# Patient Record
Sex: FEMALE | Race: OTHER | NOT HISPANIC OR LATINO
[De-identification: names, ages, dates, MRNs, and addresses within clinical notes are randomized per-mention and may not be internally consistent; named-entity substitution may affect disease eponyms.]

---

## 2020-06-23 PROBLEM — Z00.00 ENCOUNTER FOR PREVENTIVE HEALTH EXAMINATION: Status: ACTIVE | Noted: 2020-06-23

## 2020-06-30 ENCOUNTER — APPOINTMENT (OUTPATIENT)
Dept: UROGYNECOLOGY | Facility: CLINIC | Age: 50
End: 2020-06-30
Payer: COMMERCIAL

## 2020-06-30 DIAGNOSIS — Z78.9 OTHER SPECIFIED HEALTH STATUS: ICD-10-CM

## 2020-06-30 DIAGNOSIS — M53.9 DORSOPATHY, UNSPECIFIED: ICD-10-CM

## 2020-06-30 LAB
BILIRUB UR QL STRIP: NEGATIVE
CLARITY UR: CLEAR
COLLECTION METHOD: NORMAL
GLUCOSE UR-MCNC: NEGATIVE
HCG UR QL: 0.2 EU/DL
HGB UR QL STRIP.AUTO: NORMAL
KETONES UR-MCNC: NEGATIVE
LEUKOCYTE ESTERASE UR QL STRIP: NEGATIVE
NITRITE UR QL STRIP: NEGATIVE
PH UR STRIP: 6.5
PROT UR STRIP-MCNC: NORMAL
SP GR UR STRIP: 1.02

## 2020-06-30 PROCEDURE — 51701 INSERT BLADDER CATHETER: CPT

## 2020-06-30 PROCEDURE — 81003 URINALYSIS AUTO W/O SCOPE: CPT | Mod: QW

## 2020-06-30 PROCEDURE — 99204 OFFICE O/P NEW MOD 45 MIN: CPT | Mod: 25

## 2020-06-30 NOTE — ASSESSMENT
[FreeTextEntry1] : Mauro is a pleasant pre-menop P3, PSHx includes LSC BTL, presents with 5 months of symptomatic POP. On exam, her empty supine CST was neg and she did not have positive urethral hypermobility. Her straight-cath PVR volume was normal and was sent for UA UCx to eval for MH. On pelvic exam, there were no appreciable masses or lesions or cyst. Pelvic floor muscle contraction strength was present but weak. There was no levator or pelvic floor musculature banding, tightness, or tenderness. POPQ exam showed stage II uterine POP only, palpable fibroid uterus with questionable considerable bulky/long cervix component.\par \par POP and its management options including observation, kegels with or without PT, biofeedback, pessary, and surgery were reviewed. Pessary care reviewed. Surgically we discussed abd vs robotic TABITHA / BS / ASC / ? TVT APR / cysto. I will await her TVUS results and uterine size to eval further, but doubt she has vag access for the size of her fibroid uterus to be able to be offered a vaginal approach. R/B/A and efficacies of surgeries discussed. Abd USLS can be done as well. She will return for UDS to eval for OSUI, and cysto to eval freq and obstructive symptoms. She will followup thereafter, all ques answered.\par \par Plan:\par [] TVUS \par [] cysto\par [] UDS\par [] f/u UA, UCx\par [] surg disc, anticipate booking RASCH / BS / ASC / ? TVT and APR / cysto

## 2020-06-30 NOTE — PHYSICAL EXAM
[Chaperone Present] : A chaperone was present in the examining room during all aspects of the physical examination [No Acute Distress] : in no acute distress [Oriented x3] : oriented to person, place, and time [No Edema] : ~T edema was not present [Symmetrical] : the neck was ~L symmetrical [None] : no CVA tenderness [Warm and Dry] : was warm and dry to touch [Normal Gait] : gait was normal [Labia Majora] : were normal [Labia Minora] : were normal [Bartholin's Gland] : both Bartholin's glands were normal  [Normal Appearance] : general appearance was normal [Ba ____] : Ba [unfilled] [Aa ____] : Aa [unfilled] [C ____] : C [unfilled] [PB ____] : PB [unfilled] [GH ____] : GH [unfilled] [TVL ____] : TVL  [unfilled] [Bp ____] : Bp [unfilled] [Ap ____] : Ap [unfilled] [D ____] : D [unfilled] [] : II [Normal] : normal [Post Void Residual ____ml] : post void residual was [unfilled] ml [Soft] :  the cervix was soft [Exam Deferred] : was deferred [Cough] : no cough [Inguinal LAD] : no adenopathy was noted in the inguinal lymph nodes [Tenderness] : ~T no ~M abdominal tenderness observed [Distended] : not distended [FreeTextEntry3] : uroflow low vol void for interpretation, CST neg, no appreciable urethral hypermobility [de-identified] : bulky fibroid uterus, ? apical descent vs bulk factor [FreeTextEntry4] : no cyst mass or lesion [de-identified] : no appreciable adnexal masses however palpable mobile fibroid uterus ? size about 15 cm

## 2020-06-30 NOTE — HISTORY OF PRESENT ILLNESS
[FreeTextEntry1] : Noticed bulge from vagina 2/20 and was concerned, stopped exercising, aware of bulge and was concerned. No intervention as of yet. No incomplete bladder emptying or leakage of urine, no dysuria or gross hematuria, no flank pain. Reports this year intermittent constipation - incomplete evacuation of normal consistency stools, not hard consistency. Associated sexual dysfunction as she was not sure what to do with the prolapse. Premenopausal, LMP last week and irreg this year. Prior to 6/20/20 LMP, no menses in two months. Until earlier this year, was getting regular monthly cycles without irregularity. Reports known asymptomatic fibroid uterus x 20 years. Chronic anemia, reports using ferritin and vit C irregularly to improve it. Reports TVUS last done in 2018 per Gyn.\par \par colonoscopy 2020 normal, diverticulosis, internal hemarrhoids\par 2020 labs recently H/H 12/37 (10/33 in 11/19), Bun/Cr 14/0.6\par \par PSH includes BTL\par Never a smoker\par P3

## 2020-06-30 NOTE — REASON FOR VISIT
[Questionnaire Received] : Patient questionnaire received [Pelvic Organ Prolapse] : pelvic organ prolapse [Sexual Dysfunction] : sexual dysfunction [Problems With Defecation] : problems with defecation

## 2020-06-30 NOTE — OB HISTORY
[Vaginal ___] : [unfilled] vaginal delivery(s) [Definite ___ (Date)] : the last menstrual period was [unfilled] [Last Pap Smear ___] : date of last pap smear was on [unfilled] [Sexually Active] : sexually active [FreeTextEntry1] : largest baby 7 lbs 11 oz

## 2020-06-30 NOTE — PROCEDURE
[Straight Catheterization] : insertion of a straight catheter [Urinary Retention] : urinary retention [Urinary Frequency] : urinary frequency [Patient] : the patient [Intraurethral 2% Lidocaine Gel ___ (cc)] : Local Anesthesia: [unfilled] cc of 2% Lidocaine Gel was administered intraurethrally  [___ Fr Straight Tip] : a [unfilled] in [place]inese straight tip catheter [Clear] : clear [None] : there were no complications with the catheter insertion [Culture] : culture [No Complications] : no complications [Urinalysis] : urinalysis [Tolerated Well] : the patient tolerated the procedure well [1] : 1 [Post procedure instructions and information given] : Post procedure instructions and information were given and reviewed with patient. [FreeTextEntry1] : cathed to obtain PVR and uncontaminated specimen

## 2020-07-01 LAB
APPEARANCE: CLEAR
BACTERIA: NEGATIVE
BILIRUBIN URINE: NEGATIVE
BLOOD URINE: ABNORMAL
COLOR: YELLOW
GLUCOSE QUALITATIVE U: NEGATIVE
HYALINE CASTS: 1 /LPF
KETONES URINE: NEGATIVE
LEUKOCYTE ESTERASE URINE: NEGATIVE
MICROSCOPIC-UA: NORMAL
NITRITE URINE: NEGATIVE
PH URINE: 6.5
PROTEIN URINE: NORMAL
RED BLOOD CELLS URINE: 2 /HPF
SPECIFIC GRAVITY URINE: 1.03
SQUAMOUS EPITHELIAL CELLS: 3 /HPF
UROBILINOGEN URINE: NORMAL
WHITE BLOOD CELLS URINE: 0 /HPF

## 2020-07-02 LAB — BACTERIA UR CULT: NORMAL

## 2020-07-09 ENCOUNTER — APPOINTMENT (OUTPATIENT)
Dept: UROGYNECOLOGY | Facility: CLINIC | Age: 50
End: 2020-07-09
Payer: COMMERCIAL

## 2020-07-09 PROCEDURE — 51784 ANAL/URINARY MUSCLE STUDY: CPT

## 2020-07-09 PROCEDURE — 51741 ELECTRO-UROFLOWMETRY FIRST: CPT

## 2020-07-09 PROCEDURE — 51797 INTRAABDOMINAL PRESSURE TEST: CPT

## 2020-07-09 PROCEDURE — 51729 CYSTOMETROGRAM W/VP&UP: CPT

## 2020-07-15 ENCOUNTER — APPOINTMENT (OUTPATIENT)
Dept: UROGYNECOLOGY | Facility: CLINIC | Age: 50
End: 2020-07-15
Payer: COMMERCIAL

## 2020-07-15 DIAGNOSIS — R31.9 HEMATURIA, UNSPECIFIED: ICD-10-CM

## 2020-07-15 PROCEDURE — 58100 BIOPSY OF UTERUS LINING: CPT

## 2020-07-15 PROCEDURE — 52000 CYSTOURETHROSCOPY: CPT

## 2020-07-15 PROCEDURE — 99213 OFFICE O/P EST LOW 20 MIN: CPT | Mod: 25

## 2020-07-21 LAB — CORE LAB BIOPSY: NORMAL

## 2020-07-22 ENCOUNTER — OUTPATIENT (OUTPATIENT)
Dept: OUTPATIENT SERVICES | Facility: HOSPITAL | Age: 50
LOS: 1 days | End: 2020-07-22
Payer: COMMERCIAL

## 2020-07-22 VITALS
TEMPERATURE: 98 F | HEART RATE: 66 BPM | WEIGHT: 145.51 LBS | HEIGHT: 64 IN | DIASTOLIC BLOOD PRESSURE: 92 MMHG | SYSTOLIC BLOOD PRESSURE: 130 MMHG | RESPIRATION RATE: 20 BRPM

## 2020-07-22 DIAGNOSIS — Z98.890 OTHER SPECIFIED POSTPROCEDURAL STATES: Chronic | ICD-10-CM

## 2020-07-22 DIAGNOSIS — Z29.9 ENCOUNTER FOR PROPHYLACTIC MEASURES, UNSPECIFIED: ICD-10-CM

## 2020-07-22 DIAGNOSIS — Z98.51 TUBAL LIGATION STATUS: Chronic | ICD-10-CM

## 2020-07-22 DIAGNOSIS — R39.15 URGENCY OF URINATION: ICD-10-CM

## 2020-07-22 DIAGNOSIS — N81.3 COMPLETE UTEROVAGINAL PROLAPSE: ICD-10-CM

## 2020-07-22 DIAGNOSIS — Z01.818 ENCOUNTER FOR OTHER PREPROCEDURAL EXAMINATION: ICD-10-CM

## 2020-07-22 LAB
A1C WITH ESTIMATED AVERAGE GLUCOSE RESULT: 4.9 % — SIGNIFICANT CHANGE UP (ref 4–5.6)
ANION GAP SERPL CALC-SCNC: 11 MMOL/L — SIGNIFICANT CHANGE UP (ref 5–17)
APPEARANCE UR: CLEAR — SIGNIFICANT CHANGE UP
APTT BLD: 32.7 SEC — SIGNIFICANT CHANGE UP (ref 27.5–35.5)
BASOPHILS # BLD AUTO: 0.03 K/UL — SIGNIFICANT CHANGE UP (ref 0–0.2)
BASOPHILS NFR BLD AUTO: 0.6 % — SIGNIFICANT CHANGE UP (ref 0–2)
BILIRUB UR-MCNC: NEGATIVE — SIGNIFICANT CHANGE UP
BLD GP AB SCN SERPL QL: SIGNIFICANT CHANGE UP
BUN SERPL-MCNC: 11 MG/DL — SIGNIFICANT CHANGE UP (ref 8–20)
CALCIUM SERPL-MCNC: 8.5 MG/DL — LOW (ref 8.6–10.2)
CHLORIDE SERPL-SCNC: 100 MMOL/L — SIGNIFICANT CHANGE UP (ref 98–107)
CO2 SERPL-SCNC: 24 MMOL/L — SIGNIFICANT CHANGE UP (ref 22–29)
COLOR SPEC: YELLOW — SIGNIFICANT CHANGE UP
CREAT SERPL-MCNC: 0.57 MG/DL — SIGNIFICANT CHANGE UP (ref 0.5–1.3)
DIFF PNL FLD: NEGATIVE — SIGNIFICANT CHANGE UP
EOSINOPHIL # BLD AUTO: 0.09 K/UL — SIGNIFICANT CHANGE UP (ref 0–0.5)
EOSINOPHIL NFR BLD AUTO: 1.8 % — SIGNIFICANT CHANGE UP (ref 0–6)
ESTIMATED AVERAGE GLUCOSE: 94 MG/DL — SIGNIFICANT CHANGE UP (ref 68–114)
GLUCOSE SERPL-MCNC: 82 MG/DL — SIGNIFICANT CHANGE UP (ref 70–99)
GLUCOSE UR QL: NEGATIVE MG/DL — SIGNIFICANT CHANGE UP
HCT VFR BLD CALC: 35.5 % — SIGNIFICANT CHANGE UP (ref 34.5–45)
HGB BLD-MCNC: 11.2 G/DL — LOW (ref 11.5–15.5)
IMM GRANULOCYTES NFR BLD AUTO: 0.2 % — SIGNIFICANT CHANGE UP (ref 0–1.5)
INR BLD: 1.04 RATIO — SIGNIFICANT CHANGE UP (ref 0.88–1.16)
KETONES UR-MCNC: NEGATIVE — SIGNIFICANT CHANGE UP
LEUKOCYTE ESTERASE UR-ACNC: NEGATIVE — SIGNIFICANT CHANGE UP
LYMPHOCYTES # BLD AUTO: 1.46 K/UL — SIGNIFICANT CHANGE UP (ref 1–3.3)
LYMPHOCYTES # BLD AUTO: 28.7 % — SIGNIFICANT CHANGE UP (ref 13–44)
MCHC RBC-ENTMCNC: 28.5 PG — SIGNIFICANT CHANGE UP (ref 27–34)
MCHC RBC-ENTMCNC: 31.5 GM/DL — LOW (ref 32–36)
MCV RBC AUTO: 90.3 FL — SIGNIFICANT CHANGE UP (ref 80–100)
MONOCYTES # BLD AUTO: 0.28 K/UL — SIGNIFICANT CHANGE UP (ref 0–0.9)
MONOCYTES NFR BLD AUTO: 5.5 % — SIGNIFICANT CHANGE UP (ref 2–14)
NEUTROPHILS # BLD AUTO: 3.22 K/UL — SIGNIFICANT CHANGE UP (ref 1.8–7.4)
NEUTROPHILS NFR BLD AUTO: 63.2 % — SIGNIFICANT CHANGE UP (ref 43–77)
NITRITE UR-MCNC: NEGATIVE — SIGNIFICANT CHANGE UP
PH UR: 5 — SIGNIFICANT CHANGE UP (ref 5–8)
PLATELET # BLD AUTO: 233 K/UL — SIGNIFICANT CHANGE UP (ref 150–400)
POTASSIUM SERPL-MCNC: 3.6 MMOL/L — SIGNIFICANT CHANGE UP (ref 3.5–5.3)
POTASSIUM SERPL-SCNC: 3.6 MMOL/L — SIGNIFICANT CHANGE UP (ref 3.5–5.3)
PROT UR-MCNC: NEGATIVE MG/DL — SIGNIFICANT CHANGE UP
PROTHROM AB SERPL-ACNC: 12 SEC — SIGNIFICANT CHANGE UP (ref 10.6–13.6)
RBC # BLD: 3.93 M/UL — SIGNIFICANT CHANGE UP (ref 3.8–5.2)
RBC # FLD: 12.2 % — SIGNIFICANT CHANGE UP (ref 10.3–14.5)
SODIUM SERPL-SCNC: 135 MMOL/L — SIGNIFICANT CHANGE UP (ref 135–145)
SP GR SPEC: 1.02 — SIGNIFICANT CHANGE UP (ref 1.01–1.02)
UROBILINOGEN FLD QL: NEGATIVE MG/DL — SIGNIFICANT CHANGE UP
WBC # BLD: 5.09 K/UL — SIGNIFICANT CHANGE UP (ref 3.8–10.5)
WBC # FLD AUTO: 5.09 K/UL — SIGNIFICANT CHANGE UP (ref 3.8–10.5)

## 2020-07-22 PROCEDURE — G0463: CPT

## 2020-07-22 PROCEDURE — 93010 ELECTROCARDIOGRAM REPORT: CPT

## 2020-07-22 PROCEDURE — 93005 ELECTROCARDIOGRAM TRACING: CPT

## 2020-07-22 NOTE — H&P PST ADULT - NSICDXPASTSURGICALHX_GEN_ALL_CORE_FT
PAST SURGICAL HISTORY:  H/O colonoscopy 1/22/20    H/O tubal ligation PAST SURGICAL HISTORY:  H/O breast biopsy     H/O colonoscopy 1/22/20    H/O tubal ligation

## 2020-07-22 NOTE — H&P PST ADULT - HISTORY OF PRESENT ILLNESS
48 yo F   x3 LMP 2020 menstrual bleeding from - 48 yo F   x3 LMP 2020 c/o irregular menstrual bleeding from -. Recent biopsy showed multiple uterine fibroids, concern for polyp. Presents for preop assessment   Denies fevers, chills, abdominal or pelvic pain 50 yo F   x3 LMP 2020 c/o uterine prolapse since 2020 and irregular menstrual bleeding from -. Recent MRI/sono showed multiple uterine fibroids, concern for polyp. Pt denies fevers, chills, abdominal or pelvic pain, n/v/c/d, bloating or distension, dysuria or urinary incontinence. Pt endorses increased urinary frequency and needing to urinate at night. Presents for preop assessment prior to robotic assisted total hysterectomy, b/l salpingectomy, uterosacral ligament suspension, cystoscopy w/Dr Fulton on .

## 2020-07-22 NOTE — H&P PST ADULT - NSICDXPROBLEM_GEN_ALL_CORE_FT
PROBLEM DIAGNOSES  Problem: Urinary urgency  Assessment and Plan: preop assessment, med clearance pending, robotic assisted total hysterectomy, b/l salpingectomy, uterosacral ligament suspension, cystoscopy 8/5    Problem: Complete uterovaginal prolapse  Assessment and Plan: preop assessment, med clearance pending, robotic assisted total hysterectomy, b/l salpingectomy, uterosacral ligament supension, cystoscopy 8/5    Problem: Need for prophylactic measure  Assessment and Plan: caprini score 3, moderate risk for dvt SCD ordered, surgical team to assess for dvt prophylaxis

## 2020-07-22 NOTE — H&P PST ADULT - NSICDXFAMILYHX_GEN_ALL_CORE_FT
FAMILY HISTORY:  FH: hypertension, mother FAMILY HISTORY:  Family history of osteoarthritis, mother, sister  FH: hypertension, mother, sister  FH: thyroid disease, sister

## 2020-07-22 NOTE — H&P PST ADULT - ASSESSMENT
50 yo F 48 yo F   x3 LMP 2020 c/o uterine prolapse since 2020 and irregular menstrual bleeding from -. Recent MRI/sono showed multiple uterine fibroids, concern for polyp. Pt denies fevers, chills, abdominal or pelvic pain, n/v/c/d, bloating or distension, dysuria or urinary incontinence. Pt endorses increased urinary frequency and needing to urinate at night. Presents for preop assessment prior to robotic assisted total hysterectomy, b/l salpingectomy, uterosacral ligament suspension, cystoscopy w/Dr Fulton on .       OPIOID RISK TOOL    ALINA EACH BOX THAT APPLIES AND ADD TOTALS AT THE END    FAMILY HISTORY OF SUBSTANCE ABUSE                 FEMALE         MALE                                                Alcohol                             [  ]1 pt          [  ]3pts                                               Illegal Durgs                     [  ]2 pts        [  ]3pts                                               Rx Drugs                           [  ]4 pts        [  ]4 pts    PERSONAL HISTORY OF SUBSTANCE ABUSE                                                                                          Alcohol                             [  ]3 pts       [  ]3 pts                                               Illegal Drugs                     [  ]4 pts        [  ]4 pts                                               Rx Drugs                           [  ]5 pts        [  ]5 pts    AGE BETWEEN 16-45 YEARS                                      [  ]1 pt         [  ]1 pt    HISTORY OF PREADOLESCENT   SEXUAL ABUSE                                                             [  ]3 pts        [  ]0pts    PSYCHOLOGICAL DISEASE                     ADD, OCD, Bipolar, Schizophrenia        [  ]2 pts         [  ]2 pts                      Depression                                               [  ]1 pt           [  ]1 pt           SCORING TOTAL   (add numbers and type here)              ( 0 )                                     A score of 3 or lower indicated LOW risk for future opioid abuse  A score of 4 to 7 indicated moderate risk for future opioid abuse  A score of 8 or higher indicates a high risk for opioid abuse    CAPRINI SCORE [CLOT]    AGE RELATED RISK FACTORS                                                       MOBILITY RELATED FACTORS  [ x] Age 41-60 years                                            (1 Point)                  [ ] Bed rest                                                        (1 Point)  [ ] Age: 61-74 years                                           (2 Points)                 [ ] Plaster cast                                                   (2 Points)  [ ] Age= 75 years                                              (3 Points)                 [ ] Bed bound for more than 72 hours                 (2 Points)    DISEASE RELATED RISK FACTORS                                               GENDER SPECIFIC FACTORS  [ ] Edema in the lower extremities                       (1 Point)                  [ ] Pregnancy                                                     (1 Point)  [ ] Varicose veins                                               (1 Point)                  [ ] Post-partum < 6 weeks                                   (1 Point)             [ ] BMI > 25 Kg/m2                                            (1 Point)                  [ ] Hormonal therapy  or oral contraception          (1 Point)                 [ ] Sepsis (in the previous month)                        (1 Point)                  [ ] History of pregnancy complications                 (1 point)  [ ] Pneumonia or serious lung disease                                               [ ] Unexplained or recurrent                     (1 Point)           (in the previous month)                               (1 Point)  [ ] Abnormal pulmonary function test                     (1 Point)                 SURGERY RELATED RISK FACTORS  [ ] Acute myocardial infarction                              (1 Point)                 [ ]  Section                                             (1 Point)  [ ] Congestive heart failure (in the previous month)  (1 Point)               [ ] Minor surgery                                                  (1 Point)   [ ] Inflammatory bowel disease                             (1 Point)                 [ ] Arthroscopic surgery                                        (2 Points)  [ ] Central venous access                                      (2 Points)                [x] General surgery lasting more than 45 minutes   (2 Points)       [ ] Stroke (in the previous month)                          (5 Points)               [ ] Elective arthroplasty                                         (5 Points)                                                                                                                                               HEMATOLOGY RELATED FACTORS                                                 TRAUMA RELATED RISK FACTORS  [ ] Prior episodes of VTE                                     (3 Points)                [ ] Fracture of the hip, pelvis, or leg                       (5 Points)  [ ] Positive family history for VTE                         (3 Points)                 [ ] Acute spinal cord injury (in the previous month)  (5 Points)  [ ] Prothrombin 82384 A                                     (3 Points)                 [ ] Paralysis  (less than 1 month)                             (5 Points)  [ ] Factor V Leiden                                             (3 Points)                  [ ] Multiple Trauma within 1 month                        (5 Points)  [ ] Lupus anticoagulants                                     (3 Points)                                                           [ ] Anticardiolipin antibodies                               (3 Points)                                                       [ ] High homocysteine in the blood                      (3 Points)                                             [ ] Other congenital or acquired thrombophilia      (3 Points)                                                [ ] Heparin induced thrombocytopenia                  (3 Points)                                          Total Score [       3   ]    Caprini Score 0 - 2:  Low Risk, No VTE Prophylaxis required for most patients, encourage ambulation  Caprini Score 3 - 6:  At Risk, pharmacologic VTE prophylaxis is indicated for most patients (in the absence of a contraindication)  Caprini Score Greater than or = 7:  High Risk, pharmacologic VTE prophylaxis is indicated for most patients (in the absence of a contraindication)

## 2020-07-22 NOTE — H&P PST ADULT - NSICDXPASTMEDICALHX_GEN_ALL_CORE_FT
PAST MEDICAL HISTORY:  Complete uterovaginal prolapse     Urinary urgency PAST MEDICAL HISTORY:  Complete uterovaginal prolapse     Urinary urgency     Uterine leiomyoma

## 2020-07-23 LAB
CULTURE RESULTS: SIGNIFICANT CHANGE UP
SPECIMEN SOURCE: SIGNIFICANT CHANGE UP

## 2020-07-24 ENCOUNTER — APPOINTMENT (OUTPATIENT)
Dept: UROGYNECOLOGY | Facility: CLINIC | Age: 50
End: 2020-07-24
Payer: COMMERCIAL

## 2020-07-24 DIAGNOSIS — N92.1 EXCESSIVE AND FREQUENT MENSTRUATION WITH IRREGULAR CYCLE: ICD-10-CM

## 2020-07-24 PROCEDURE — 99214 OFFICE O/P EST MOD 30 MIN: CPT

## 2020-07-24 NOTE — HISTORY OF PRESENT ILLNESS
[FreeTextEntry1] : Symptomatic stage II uterine prolapse, urinary freq urge. Desires surgical intervention, declines nonsurg POP txs such as observation, kegels/PT, pessary use. Sexually active. \par \par TVUS results discussed: large 12 x 10 cm with multiple fibroids uterus noted, EE normal however + moderate Em fluid and ? polyp; ovs normal with a simple appearing unilateral cyst and no FF in cul-de-sac.\par \par MRI pelvis with and without contrast reviewed: uterus 9 x 9 x 10 cm with multiple fibroids: intramural left 5 cm, posterior abutting em cavity 2 cm, 3 cm left intramural, 3 cm intramural/suberosal fundal; em cav 13 mm, junct zone 6 mm, small FF, 1 cm left adnexal cyst without enhancement, no bulky pelvic LAD, ut prolapse.\par \par Cysto: normal.\par UDS: elevated pvr 120 ml, low capacity, no GSUI, no ISD, no DO. \par Embx: fragments of endometrial polyp, benign cervical tissue\par \par Hx anemia however most recent labs H/H wnl - she states after iron supplementation\par PSH includes BTL\par Never a smoker\par P3 \par NKDA

## 2020-08-01 DIAGNOSIS — Z01.818 ENCOUNTER FOR OTHER PREPROCEDURAL EXAMINATION: ICD-10-CM

## 2020-08-02 ENCOUNTER — APPOINTMENT (OUTPATIENT)
Dept: DISASTER EMERGENCY | Facility: CLINIC | Age: 50
End: 2020-08-02

## 2020-08-03 LAB — SARS-COV-2 N GENE NPH QL NAA+PROBE: NOT DETECTED

## 2020-08-03 RX ORDER — OXYCODONE AND ACETAMINOPHEN 5; 325 MG/1; MG/1
5-325 TABLET ORAL
Qty: 10 | Refills: 0 | Status: ACTIVE | COMMUNITY
Start: 2020-08-03 | End: 1900-01-01

## 2020-08-04 DIAGNOSIS — N83.209 UNSPECIFIED OVARIAN CYST, UNSPECIFIED SIDE: ICD-10-CM

## 2020-08-05 ENCOUNTER — APPOINTMENT (OUTPATIENT)
Dept: UROGYNECOLOGY | Facility: HOSPITAL | Age: 50
End: 2020-08-05

## 2020-08-05 PROBLEM — R39.15 URGENCY OF URINATION: Chronic | Status: ACTIVE | Noted: 2020-07-22

## 2020-08-05 PROBLEM — D25.9 LEIOMYOMA OF UTERUS, UNSPECIFIED: Chronic | Status: ACTIVE | Noted: 2020-07-22

## 2020-08-05 PROBLEM — N81.3 COMPLETE UTEROVAGINAL PROLAPSE: Chronic | Status: ACTIVE | Noted: 2020-07-22

## 2020-08-05 LAB — CANCER AG125 SERPL-ACNC: 26 U/ML

## 2020-08-06 ENCOUNTER — APPOINTMENT (OUTPATIENT)
Dept: ORTHOPEDIC SURGERY | Facility: CLINIC | Age: 50
End: 2020-08-06
Payer: COMMERCIAL

## 2020-08-06 VITALS
WEIGHT: 145 LBS | TEMPERATURE: 98 F | DIASTOLIC BLOOD PRESSURE: 86 MMHG | HEIGHT: 65 IN | HEART RATE: 75 BPM | BODY MASS INDEX: 24.16 KG/M2 | SYSTOLIC BLOOD PRESSURE: 136 MMHG

## 2020-08-06 PROCEDURE — 99203 OFFICE O/P NEW LOW 30 MIN: CPT

## 2020-08-06 NOTE — PHYSICAL EXAM
[FreeTextEntry1] : General: well nourished, in no acute distress, alert and oriented to person, place and time.\par Psychiatric: normal mood and affect, no abnormal movements or speech patterns.\par Eyes: vision intact without deficits, sclera and conjunctiva were normal, pupils were equal in size. \par ENT: Ears and nose were normal in appearance. No thyromegaly.\par Lymph: no enlarged nodes, no lymphedema in extremity.\par Respiratory: Normal respiratory rhythm and effort. No wheezing detected without auscultation. No shortness of breath or respiratory distress.\par Cardiac: no cardiac related leg swelling, 2+ peripheral pulses.\par Neurology: normal gross sensation in extremities to light touch.\par Abdomen: soft, non-tender, tympanic, no masses.\par \par Spine:\par Gait: ambulates fluidly without assistance. Able to heel and toe walk. \par Skin intact, no ecchymosis or cutaneous lesions. Overall alignment neutral.  There are no palpable masses.\par No TTP over spinous processes or with deep palpation over the sacrum. No TTP over b/l lumbar paraspinal muscles. \par B/L L2-S1 5/5. \par B/L L2-S1 SILT.\par Negative SLR B/L\par No clonus or saddle anesthesia.\par Symmetric reflexes.

## 2020-08-06 NOTE — DATA REVIEWED
[de-identified] : MRI (7/29/2020, [facility]): There is a 1.2 x 1.3 cm enhancing lesion on the left side of the sacrum.

## 2020-08-06 NOTE — HISTORY OF PRESENT ILLNESS
[FreeTextEntry1] : This is a 49-year-old female with no past medical history who presents for evaluation of an incidentally found lesion within her left sacrum.  This was found after she was being worked up for a uterine myoma, being planned for elective hysterectomy.  The hysterectomy has been postponed due to this finding.  During this time she has not had any symptoms related to this lesion.  She does describe 2 weeks of mild lower back pain which is centrally located and does not radiate.  This pain can be up to 9 out of 10 at times.  She denies any recent trauma or twisting injury.  She also denies any constitutional symptoms.  She has not required any pain medication during this time.

## 2020-08-14 ENCOUNTER — APPOINTMENT (OUTPATIENT)
Dept: NUCLEAR MEDICINE | Facility: IMAGING CENTER | Age: 50
End: 2020-08-14
Payer: COMMERCIAL

## 2020-08-14 ENCOUNTER — OUTPATIENT (OUTPATIENT)
Dept: OUTPATIENT SERVICES | Facility: HOSPITAL | Age: 50
LOS: 1 days | End: 2020-08-14
Payer: SELF-PAY

## 2020-08-14 ENCOUNTER — RESULT REVIEW (OUTPATIENT)
Age: 50
End: 2020-08-14

## 2020-08-14 DIAGNOSIS — Z98.51 TUBAL LIGATION STATUS: Chronic | ICD-10-CM

## 2020-08-14 DIAGNOSIS — M53.3 SACROCOCCYGEAL DISORDERS, NOT ELSEWHERE CLASSIFIED: ICD-10-CM

## 2020-08-14 DIAGNOSIS — Z98.890 OTHER SPECIFIED POSTPROCEDURAL STATES: Chronic | ICD-10-CM

## 2020-08-14 PROCEDURE — A9561: CPT

## 2020-08-14 PROCEDURE — 78830 RP LOCLZJ TUM SPECT W/CT 1: CPT

## 2020-08-14 PROCEDURE — 78830 RP LOCLZJ TUM SPECT W/CT 1: CPT | Mod: 26

## 2020-08-14 PROCEDURE — 78306 BONE IMAGING WHOLE BODY: CPT | Mod: 26

## 2020-08-14 PROCEDURE — 78306 BONE IMAGING WHOLE BODY: CPT

## 2020-08-21 ENCOUNTER — APPOINTMENT (OUTPATIENT)
Dept: UROGYNECOLOGY | Facility: CLINIC | Age: 50
End: 2020-08-21

## 2020-09-02 ENCOUNTER — APPOINTMENT (OUTPATIENT)
Dept: INTERVENTIONAL RADIOLOGY/VASCULAR | Facility: CLINIC | Age: 50
End: 2020-09-02
Payer: COMMERCIAL

## 2020-09-02 VITALS
SYSTOLIC BLOOD PRESSURE: 137 MMHG | HEART RATE: 77 BPM | OXYGEN SATURATION: 100 % | BODY MASS INDEX: 23.99 KG/M2 | WEIGHT: 144 LBS | HEIGHT: 65 IN | DIASTOLIC BLOOD PRESSURE: 94 MMHG | TEMPERATURE: 98.4 F | RESPIRATION RATE: 14 BRPM

## 2020-09-02 PROCEDURE — XXXXX: CPT

## 2020-09-06 ENCOUNTER — TRANSCRIPTION ENCOUNTER (OUTPATIENT)
Age: 50
End: 2020-09-06

## 2020-09-08 ENCOUNTER — LABORATORY RESULT (OUTPATIENT)
Age: 50
End: 2020-09-08

## 2020-09-08 ENCOUNTER — APPOINTMENT (OUTPATIENT)
Dept: DISASTER EMERGENCY | Facility: CLINIC | Age: 50
End: 2020-09-08

## 2020-09-08 NOTE — ASSESSMENT
[FreeTextEntry1] : MRI and bone scan reviewed. Plan is for CT guided biopsy of sclerotic bone scan + lesion in the left side of the sacrum with anesthesia.\par \par Risks of percutaneous biopsy of left sacral lesion, including but not limited to bleeding, infection and injury to surrounding structure, discussed with the patient.  All questions were answered.  Patient elects to proceed with the biopsy.\par \par

## 2020-09-08 NOTE — HISTORY OF PRESENT ILLNESS
[FreeTextEntry1] : 50 yr old female with hx of uterine myoma. Pt also reports uterine fibroids along with dysmenorrhea. She was undergoing presurgical planning for elective hysterectomy. She got an MRI pelvis (7/29/2020) with incidental finding of: 1.2 x 1.3 cm enhancing lesion on the left side of the sacrum. She reports lower back pain. At times she feels a sharp pain which usually comes on after sitting for long periods. She is unclear as to what brings about the pain and what alleviates the pain. She has not taken anything for the pain. She has tried stretching and rest to try to alleviate the pain. Pt also reports uterine fibroids along with dysmenorrhea.

## 2020-09-09 ENCOUNTER — APPOINTMENT (OUTPATIENT)
Dept: DISASTER EMERGENCY | Facility: CLINIC | Age: 50
End: 2020-09-09

## 2020-09-09 ENCOUNTER — LABORATORY RESULT (OUTPATIENT)
Age: 50
End: 2020-09-09

## 2020-09-11 ENCOUNTER — RESULT REVIEW (OUTPATIENT)
Age: 50
End: 2020-09-11

## 2020-09-11 ENCOUNTER — OUTPATIENT (OUTPATIENT)
Dept: OUTPATIENT SERVICES | Facility: HOSPITAL | Age: 50
LOS: 1 days | End: 2020-09-11
Payer: COMMERCIAL

## 2020-09-11 DIAGNOSIS — Z98.51 TUBAL LIGATION STATUS: Chronic | ICD-10-CM

## 2020-09-11 DIAGNOSIS — Z98.890 OTHER SPECIFIED POSTPROCEDURAL STATES: Chronic | ICD-10-CM

## 2020-09-11 DIAGNOSIS — M53.9 DORSOPATHY, UNSPECIFIED: ICD-10-CM

## 2020-09-11 PROCEDURE — 88305 TISSUE EXAM BY PATHOLOGIST: CPT | Mod: 26

## 2020-09-11 PROCEDURE — 88365 INSITU HYBRIDIZATION (FISH): CPT | Mod: 26,59

## 2020-09-11 PROCEDURE — 88367 INSITU HYBRIDIZATION AUTO: CPT | Mod: 26,59

## 2020-09-11 PROCEDURE — 88364 INSITU HYBRIDIZATION (FISH): CPT | Mod: 26,59

## 2020-09-11 PROCEDURE — 88341 IMHCHEM/IMCYTCHM EA ADD ANTB: CPT | Mod: 26,59

## 2020-09-11 PROCEDURE — 88342 IMHCHEM/IMCYTCHM 1ST ANTB: CPT | Mod: 26,59

## 2020-09-11 PROCEDURE — 20225 BONE BIOPSY TROCAR/NDL DEEP: CPT

## 2020-09-11 PROCEDURE — 77012 CT SCAN FOR NEEDLE BIOPSY: CPT | Mod: 26

## 2020-09-11 PROCEDURE — 88173 CYTOPATH EVAL FNA REPORT: CPT | Mod: 26

## 2020-09-11 PROCEDURE — 88307 TISSUE EXAM BY PATHOLOGIST: CPT | Mod: 26

## 2020-09-18 ENCOUNTER — APPOINTMENT (OUTPATIENT)
Dept: UROGYNECOLOGY | Facility: CLINIC | Age: 50
End: 2020-09-18

## 2020-09-21 LAB — NON-GYNECOLOGICAL CYTOLOGY STUDY: SIGNIFICANT CHANGE UP

## 2021-02-05 ENCOUNTER — LABORATORY RESULT (OUTPATIENT)
Age: 51
End: 2021-02-05

## 2021-02-05 ENCOUNTER — APPOINTMENT (OUTPATIENT)
Dept: ORTHOPEDIC SURGERY | Facility: CLINIC | Age: 51
End: 2021-02-05
Payer: COMMERCIAL

## 2021-02-05 VITALS
WEIGHT: 145 LBS | HEART RATE: 71 BPM | BODY MASS INDEX: 24.16 KG/M2 | SYSTOLIC BLOOD PRESSURE: 152 MMHG | HEIGHT: 65 IN | DIASTOLIC BLOOD PRESSURE: 100 MMHG | OXYGEN SATURATION: 99 %

## 2021-02-05 PROCEDURE — 72170 X-RAY EXAM OF PELVIS: CPT

## 2021-02-05 PROCEDURE — 99214 OFFICE O/P EST MOD 30 MIN: CPT

## 2021-02-05 PROCEDURE — 99072 ADDL SUPL MATRL&STAF TM PHE: CPT

## 2021-02-05 NOTE — DISCUSSION/SUMMARY
[de-identified] : This is a 50-year-old female with a left sacral lesion, possibly reactive based on the pathology findings, currently with 2 months of diffuse body aches and 2 weeks of night sweats.  Her symptoms are concerning for possible underlying condition such as multiple myeloma or other.  I have therefore recommended a skeletal survey, and a variety of labs including SPEP/UPEP, CBC, CRP, ESR, CMP, and LDH for further work-up.  Based on these findings I may then refer her to a hematologist/oncologist for possible marrow aspiration if indicated.  All of this was explained to the patient and all questions were answered.

## 2021-02-05 NOTE — PHYSICAL EXAM
[FreeTextEntry1] : General: well nourished, in no acute distress, alert and oriented to person, place and time.\par Psychiatric: normal mood and affect, no abnormal movements or speech patterns.\par Eyes: vision intact without deficits, sclera and conjunctiva were normal, pupils were equal in size. \par ENT: Ears and nose were normal in appearance. No thyromegaly.\par Lymph: no enlarged nodes, no lymphedema in extremity.\par Respiratory: Normal respiratory rhythm and effort. No wheezing detected without auscultation. No shortness of breath or respiratory distress.\par Cardiac: no cardiac related leg swelling, 2+ peripheral pulses.\par Neurology: normal gross sensation in extremities to light touch.\par Abdomen: soft, non-tender, tympanic, no masses.\par \par B/L LE:\par \par Skin CDI. No ecchymosis or swelling. \par No instability. No TTP over GT. No palpable masses. No lymphedema.\par Negative LR, HS, SLR. \par ROM: FF: 90. IR: 5. ER: 30, painless\par EHL/FHL/GS/TA 5/5. S/S/SP/DP/T SILT. Toes warm, BCR. Compartments soft.\par R foot: cdi, no TTP throughout. \par \par R elbow: skin cdi, no TTP, full painless ROM and supination/pronation. \par \par +mild midline mid back TTP without any palpable deformity.

## 2021-02-05 NOTE — DATA REVIEWED
[de-identified] : 2/5/2021–[facility] pelvis x-ray: When compared to prior x-ray from 7/29/2020, there are no obvious changes.  There are possible increased lucencies within the superior aspect of the acetabulum bilaterally, however this is difficult to ascertain on x-ray.  There are no other visible pathologic fractures or obvious osseous lesions.

## 2021-02-05 NOTE — HISTORY OF PRESENT ILLNESS
[FreeTextEntry1] : This is a 50-year-old female with no past medical history who returns for FU of an incidentally found lesion within her left sacrum. This was found after she was being worked up for a uterine myoma, being planned for elective hysterectomy. IR core biopsy was performed (9/2/2020) showed findings consistent with reactive changes: normocellular marrow with trilineage hematopoiesis with maturation, normal megakaryocytes, with unremarkable morphology, and mild plasmacytosis.  Further immunohistochemical staining was performed showing polytypic plasma cells positive for Mum 1 and negative for CD56, also consistent with reactive change.  The patient was therefore recommended to proceed with surveillance with an interval follow-up within 3 months.  The patient however failed to follow-up, mostly due to the Covid pandemic per the patient.  \par \par Today the patient reports diffuse body aches, including her right elbow, fingers, left shoulder, right foot, back, and right groin.  This all began 2 months ago.  Her pain is up to 8 out of 10 in severity, currently 2 out of 10 while in the office.  She has not tried any pain medication.  She otherwise denies any fevers or weight loss but does note 2 weeks of night sweats.

## 2021-02-06 ENCOUNTER — RESULT REVIEW (OUTPATIENT)
Age: 51
End: 2021-02-06

## 2021-02-06 ENCOUNTER — APPOINTMENT (OUTPATIENT)
Dept: RADIOLOGY | Facility: IMAGING CENTER | Age: 51
End: 2021-02-06
Payer: COMMERCIAL

## 2021-02-06 ENCOUNTER — OUTPATIENT (OUTPATIENT)
Dept: OUTPATIENT SERVICES | Facility: HOSPITAL | Age: 51
LOS: 1 days | End: 2021-02-06
Payer: COMMERCIAL

## 2021-02-06 DIAGNOSIS — Z98.890 OTHER SPECIFIED POSTPROCEDURAL STATES: Chronic | ICD-10-CM

## 2021-02-06 DIAGNOSIS — Z98.51 TUBAL LIGATION STATUS: Chronic | ICD-10-CM

## 2021-02-06 DIAGNOSIS — Z00.8 ENCOUNTER FOR OTHER GENERAL EXAMINATION: ICD-10-CM

## 2021-02-06 LAB
ALBUMIN SERPL ELPH-MCNC: 4.1 G/DL
ALP BLD-CCNC: 64 U/L
ALT SERPL-CCNC: 9 U/L
ANION GAP SERPL CALC-SCNC: 12 MMOL/L
AST SERPL-CCNC: 18 U/L
BASOPHILS # BLD AUTO: 0.03 K/UL
BASOPHILS NFR BLD AUTO: 0.7 %
BILIRUB SERPL-MCNC: 0.2 MG/DL
BUN SERPL-MCNC: 14 MG/DL
CALCIUM SERPL-MCNC: 9.2 MG/DL
CHLORIDE SERPL-SCNC: 102 MMOL/L
CO2 SERPL-SCNC: 24 MMOL/L
CREAT SERPL-MCNC: 0.82 MG/DL
CRP SERPL-MCNC: <0.1 MG/DL
EOSINOPHIL # BLD AUTO: 0.16 K/UL
EOSINOPHIL NFR BLD AUTO: 3.5 %
ERYTHROCYTE [SEDIMENTATION RATE] IN BLOOD BY WESTERGREN METHOD: 33 MM/HR
GLUCOSE SERPL-MCNC: 83 MG/DL
HCT VFR BLD CALC: 33.3 %
HGB BLD-MCNC: 9.9 G/DL
IMM GRANULOCYTES NFR BLD AUTO: 0.2 %
LDH SERPL-CCNC: 168 U/L
LYMPHOCYTES # BLD AUTO: 1.43 K/UL
LYMPHOCYTES NFR BLD AUTO: 31.6 %
MAN DIFF?: NORMAL
MCHC RBC-ENTMCNC: 21.2 PG
MCHC RBC-ENTMCNC: 29.7 GM/DL
MCV RBC AUTO: 71.5 FL
MONOCYTES # BLD AUTO: 0.26 K/UL
MONOCYTES NFR BLD AUTO: 5.8 %
NEUTROPHILS # BLD AUTO: 2.63 K/UL
NEUTROPHILS NFR BLD AUTO: 58.2 %
PLATELET # BLD AUTO: 273 K/UL
POTASSIUM SERPL-SCNC: 4.4 MMOL/L
PROT SERPL-MCNC: 7.6 G/DL
RBC # BLD: 4.66 M/UL
RBC # FLD: 23.4 %
SODIUM SERPL-SCNC: 138 MMOL/L
WBC # FLD AUTO: 4.52 K/UL

## 2021-02-06 PROCEDURE — 77074 RADEX OSSEOUS SURVEY LMTD: CPT | Mod: 26

## 2021-02-06 PROCEDURE — 77074 RADEX OSSEOUS SURVEY LMTD: CPT

## 2021-02-11 LAB
ALBUMIN MFR SERPL ELPH: 50.1 %
ALBUMIN SERPL-MCNC: 3.7 G/DL
ALBUMIN/GLOB SERPL: 1 RATIO
ALBUPE: 20 %
ALPHA1 GLOB MFR SERPL ELPH: 3.4 %
ALPHA1 GLOB SERPL ELPH-MCNC: 0.3 G/DL
ALPHA1UPE: 33 %
ALPHA2 GLOB MFR SERPL ELPH: 8.8 %
ALPHA2 GLOB SERPL ELPH-MCNC: 0.7 G/DL
ALPHA2UPE: 17.1 %
B-GLOBULIN MFR SERPL ELPH: 13.4 %
B-GLOBULIN SERPL ELPH-MCNC: 1 G/DL
BETAUPE: 17.7 %
CREAT 24H UR-MCNC: NORMAL G/24 H
CREATININE UR (MAYO): 93 MG/DL
GAMMA GLOB FLD ELPH-MCNC: 1.8 G/DL
GAMMA GLOB MFR SERPL ELPH: 24.3 %
GAMMAUPE: 12.2 %
IGA 24H UR QL IFE: NORMAL
INTERPRETATION SERPL IEP-IMP: NORMAL
KAPPA LC 24H UR QL: NORMAL
PROT PATTERN 24H UR ELPH-IMP: NORMAL
PROT SERPL-MCNC: 7.4 G/DL
PROT SERPL-MCNC: 7.4 G/DL
PROT UR-MCNC: 6 MG/DL
PROT UR-MCNC: 6 MG/DL
SPECIMEN VOL 24H UR: NORMAL ML

## 2021-03-19 ENCOUNTER — APPOINTMENT (OUTPATIENT)
Dept: UROGYNECOLOGY | Facility: CLINIC | Age: 51
End: 2021-03-19
Payer: COMMERCIAL

## 2021-03-19 ENCOUNTER — RESULT CHARGE (OUTPATIENT)
Age: 51
End: 2021-03-19

## 2021-03-19 VITALS
HEIGHT: 65 IN | SYSTOLIC BLOOD PRESSURE: 153 MMHG | WEIGHT: 145 LBS | DIASTOLIC BLOOD PRESSURE: 93 MMHG | BODY MASS INDEX: 24.16 KG/M2

## 2021-03-19 DIAGNOSIS — K59.00 CONSTIPATION, UNSPECIFIED: ICD-10-CM

## 2021-03-19 LAB
BILIRUB UR QL STRIP: NEGATIVE
CLARITY UR: CLEAR
COLLECTION METHOD: NORMAL
GLUCOSE UR-MCNC: NEGATIVE
HCG UR QL: 0.2 EU/DL
HGB UR QL STRIP.AUTO: NEGATIVE
KETONES UR-MCNC: NEGATIVE
LEUKOCYTE ESTERASE UR QL STRIP: NEGATIVE
NITRITE UR QL STRIP: NEGATIVE
PH UR STRIP: 5.5
PROT UR STRIP-MCNC: NEGATIVE
SP GR UR STRIP: 1.03

## 2021-03-19 PROCEDURE — 99072 ADDL SUPL MATRL&STAF TM PHE: CPT

## 2021-03-19 PROCEDURE — 99213 OFFICE O/P EST LOW 20 MIN: CPT | Mod: 25

## 2021-03-19 PROCEDURE — 51798 US URINE CAPACITY MEASURE: CPT

## 2021-03-19 NOTE — PHYSICAL EXAM
[Chaperone Present] : A chaperone was present in the examining room during all aspects of the physical examination [No Acute Distress] : in no acute distress [Oriented x3] : oriented to person, place, and time [Aa ____] : Aa [unfilled] [Ba ____] : Ba [unfilled] [C ____] : C [unfilled] [GH ____] : GH [unfilled] [PB ____] : PB [unfilled] [Ap ____] : Ap [unfilled] [Bp ____] : Bp [unfilled] [D ____] : D [unfilled] [] : II [Tenderness] : ~T no ~M abdominal tenderness observed [Distended] : not distended

## 2021-03-19 NOTE — ASSESSMENT
[FreeTextEntry1] : Unchanged POP, we reviewed r/b/a to tx options. She continues to desire surg. Reviewed options. Will proceed with scheduling same procedure as prior. RTO for surg disucssion: pelvic EUA / RTLH (fibroid ut) / BS / USLS / APR / Cysto / possible laparotomy / other indicated procedures.  All ques answered. Proceed with ortho and heme evals - sacral lesion, diffuse body/bone pain, anemia.

## 2021-03-19 NOTE — HISTORY OF PRESENT ILLNESS
[FreeTextEntry1] : Patient last seen in 2020, was worked up and planning for surgery: pelvic EUA / RTLH / BS / USLS / APR / Cysto / possible laparotomy / other indicated procedures. UDS was neg. During workup and eval of fibroid uterus, imaging revealed sacral lesion. Has been undergoing ortho eval for this, biopsy was benign. However in the setting of recent diffuse body pain and anemia, further blood work and eval were done. She will be seeing heme next week for further eval, and continues on oral ferrous sulfate for anemia. Feels still bothered by POP, feels may have worsened since last visit, and urinary urgency freq. No hematuria, no dysuria, no incomplete emptying, no leakage. Menses 10/2020 and then again 1/2021, feels she is perimenopausal, +hot flashes.

## 2021-03-20 ENCOUNTER — OUTPATIENT (OUTPATIENT)
Dept: OUTPATIENT SERVICES | Facility: HOSPITAL | Age: 51
LOS: 1 days | Discharge: HOME | End: 2021-03-20

## 2021-03-20 DIAGNOSIS — Z98.51 TUBAL LIGATION STATUS: Chronic | ICD-10-CM

## 2021-03-20 DIAGNOSIS — Z98.890 OTHER SPECIFIED POSTPROCEDURAL STATES: Chronic | ICD-10-CM

## 2021-03-20 DIAGNOSIS — R70.0 ELEVATED ERYTHROCYTE SEDIMENTATION RATE: ICD-10-CM

## 2021-03-20 DIAGNOSIS — D64.9 ANEMIA, UNSPECIFIED: ICD-10-CM

## 2021-03-24 ENCOUNTER — APPOINTMENT (OUTPATIENT)
Dept: UROGYNECOLOGY | Facility: CLINIC | Age: 51
End: 2021-03-24
Payer: COMMERCIAL

## 2021-03-24 DIAGNOSIS — R35.0 FREQUENCY OF MICTURITION: ICD-10-CM

## 2021-03-24 PROCEDURE — 99072 ADDL SUPL MATRL&STAF TM PHE: CPT

## 2021-03-24 PROCEDURE — 99214 OFFICE O/P EST MOD 30 MIN: CPT

## 2021-03-24 NOTE — ASSESSMENT
[FreeTextEntry1] : Symptomatic stage II POP (all apical), menorrhagia. Large fibroid uterus 10 x 12 cm plus multiple 2-5 cm fibroids - discussed - not a candidate for vaginal hyst, if vag surgery desired it would be a hysteropexy. However she has menorrhagia and desires hysterectomy as part of surgery. Abdominally, candidate for TABITHA/ASC (open vs attempt robotic) vs TLH/ASC (increases risk of mesh exposure) or robotic TLH/USLS - based on uterine size, r/b/a and efficacies of surgeries, she ops for robotic TLH/BS/USLS/possible APR/cysto. UDS neg discussed, risk of postop OSUI present, eval if needed thereafter. POP surg to correct POP, and may not affect OAB-dry symptoms reviewed. We also discussed the sacral lesion s/p workup, and she also opts for USLS and not ASC due to attachment site concerns.\par \par Abdominal versus vaginal routes of surgery were reviewed. Abdominal surgery via robotic laparoscopy vs laparotomy discussed. Surgery with or without hysterectomy discussed. Surgery with or without graft use discussed. Efficacies, risks and benefits reviewed. Abdominally, a supracervical hysterectomy plus sacral colpopexy was discussed. Vaginally, a total hysterectomy plus uterosacral ligament suspension was discussed. \par A TABITHA versus total hysterectomy was discussed. With TABITHA, risk of cervical cancer in the future and need for routine pap smear surveillance was discussed. Benefits of TABITHA including decreased risk of mesh exposure was discussed. Bilateral salpingectomy was discussed to decrease the future risk of ovarian cancer. The benefit of oophorectomy to decrease the future risk of ovarian cancer or need for future ovarian surgery was discussed. The protective benefits of retaining ovaries for bone and cardiac health as well as decreased all-cause mortality rate was discussed. \par \par The risks and benefits of surgery were discussed and included: cardiopulmonary arrest, infection, abscess formation, bleeding, hematoma formation, hemorrhage, transfusion, blood clot formation, fistula formation, rejection of graft if used, dyspareunia, chronic pain, neuropathy, damage to surrounding structures including but not limited to bowel/ureters/bladder/rectum, leakage of urine, voiding dysfunction, OAB, urinary retention, procedure failure, recurrence, need for further surgery, small bowel obstruction, defecatory dysfunction, retention, OAB, OSUI, and going home with a catheter. The FDA warning on transvaginally placed mesh for prolapse correction versus abdominally placed mesh or transvaginally placed mesh for midurethral sling was reviewed. Website for the FDA provided for information as desired. An exam under anesthesia was discussed and consented to as well. Perioperative care, anesthesia, no eating or drinking after midnight prior to the surgery, and postop precautions were reviewed. All questions were answered. She understood and would like to proceed. Consent was signed and witnessed in the office.\par \par Plan:\par [] COVID testing\par [] PSTs\par [] med clearance\par [] book pelvic EUA / RTLH / BS / USLS / APR / Cysto / possible laparotomy / other indicated procedures for 4/7/21 at [facility]

## 2021-03-24 NOTE — HISTORY OF PRESENT ILLNESS
[FreeTextEntry1] : Symptomatic stage II uterine prolapse, urinary freq urge. Desires surgical intervention, declines nonsurg POP txs such as observation, kegels/PT, pessary use. Sexually active. Last year was about to undergo surg however MRI revealed sacral lesion which has subsequently been worked up per ortho. Heme eval pending next week. Baseline anemia unchanged, asymptomatic. Perimenopausal, LMP 1/2021.\par \par TVUS results discussed: large 12 x 10 cm with multiple fibroids uterus noted, EE normal however + moderate Em fluid and ? polyp; ovs normal with a simple appearing unilateral cyst and no FF in cul-de-sac.\par \par MRI pelvis with and without contrast reviewed: uterus 9 x 9 x 10 cm with multiple fibroids: intramural left 5 cm, posterior abutting em cavity 2 cm, 3 cm left intramural, 3 cm intramural/suberosal fundal; em cav 13 mm, junct zone 6 mm, small FF, 1 cm left adnexal cyst without enhancement, no bulky pelvic LAD, ut prolapse.\par \par Cysto: normal.\par UDS: elevated pvr 120 ml, low capacity, no GSUI, no ISD, no DO. \par Embx: fragments of endometrial polyp, benign cervical tissue\par \par Hx anemia however most recent labs H/H wnl - she states after iron supplementation\par PSH includes BTL\par Never a smoker\par P3 \par NKDA

## 2021-03-25 ENCOUNTER — RESULT REVIEW (OUTPATIENT)
Age: 51
End: 2021-03-25

## 2021-03-25 ENCOUNTER — NON-APPOINTMENT (OUTPATIENT)
Age: 51
End: 2021-03-25

## 2021-03-25 ENCOUNTER — APPOINTMENT (OUTPATIENT)
Dept: HEMATOLOGY ONCOLOGY | Facility: CLINIC | Age: 51
End: 2021-03-25
Payer: COMMERCIAL

## 2021-03-25 ENCOUNTER — LABORATORY RESULT (OUTPATIENT)
Age: 51
End: 2021-03-25

## 2021-03-25 VITALS
WEIGHT: 149.89 LBS | DIASTOLIC BLOOD PRESSURE: 99 MMHG | RESPIRATION RATE: 16 BRPM | TEMPERATURE: 97.4 F | BODY MASS INDEX: 25.28 KG/M2 | HEIGHT: 64.76 IN | SYSTOLIC BLOOD PRESSURE: 152 MMHG | OXYGEN SATURATION: 100 % | HEART RATE: 72 BPM

## 2021-03-25 DIAGNOSIS — Z82.49 FAMILY HISTORY OF ISCHEMIC HEART DISEASE AND OTHER DISEASES OF THE CIRCULATORY SYSTEM: ICD-10-CM

## 2021-03-25 DIAGNOSIS — Z86.018 PERSONAL HISTORY OF OTHER BENIGN NEOPLASM: ICD-10-CM

## 2021-03-25 LAB
ALBUMIN SERPL ELPH-MCNC: 4 G/DL
ALP BLD-CCNC: 71 U/L
ALT SERPL-CCNC: 11 U/L
ANION GAP SERPL CALC-SCNC: 10 MMOL/L
AST SERPL-CCNC: 16 U/L
B2 MICROGLOB SERPL-MCNC: 2 MG/L
BASOPHILS # BLD AUTO: 0.05 K/UL — SIGNIFICANT CHANGE UP (ref 0–0.2)
BASOPHILS NFR BLD AUTO: 0.9 % — SIGNIFICANT CHANGE UP (ref 0–2)
BILIRUB SERPL-MCNC: 0.2 MG/DL
BUN SERPL-MCNC: 15 MG/DL
CALCIUM SERPL-MCNC: 9.4 MG/DL
CHLORIDE SERPL-SCNC: 104 MMOL/L
CO2 SERPL-SCNC: 26 MMOL/L
CREAT SERPL-MCNC: 0.75 MG/DL
CRP SERPL-MCNC: <3 MG/L
DEPRECATED KAPPA LC FREE/LAMBDA SER: 1.07 RATIO
EOSINOPHIL # BLD AUTO: 0.26 K/UL — SIGNIFICANT CHANGE UP (ref 0–0.5)
EOSINOPHIL NFR BLD AUTO: 4.6 % — SIGNIFICANT CHANGE UP (ref 0–6)
ERYTHROCYTE [SEDIMENTATION RATE] IN BLOOD: 8 MM/HR — SIGNIFICANT CHANGE UP (ref 0–20)
FERRITIN SERPL-MCNC: 56 NG/ML
FOLATE SERPL-MCNC: 18.8 NG/ML
FREE KAPPA URINE: 22.83 MG/L
FREE KAPPA/LAMDA RATIO: 15.22 RATIO
FREE LAMDA URINE: 1.5 MG/L
GLUCOSE SERPL-MCNC: 81 MG/DL
HCT VFR BLD CALC: 38.5 % — SIGNIFICANT CHANGE UP (ref 34.5–45)
HGB BLD-MCNC: 11.9 G/DL — SIGNIFICANT CHANGE UP (ref 11.5–15.5)
IGA SER QL IEP: 138 MG/DL
IGG SER QL IEP: 1917 MG/DL
IGM SER QL IEP: 73 MG/DL
IMM GRANULOCYTES NFR BLD AUTO: 0.4 % — SIGNIFICANT CHANGE UP (ref 0–1.5)
IRON SATN MFR SERPL: 15 %
IRON SERPL-MCNC: 48 UG/DL
KAPPA LC CSF-MCNC: 2.26 MG/DL
KAPPA LC SERPL-MCNC: 2.42 MG/DL
LDH SERPL-CCNC: 145 U/L
LYMPHOCYTES # BLD AUTO: 1.72 K/UL — SIGNIFICANT CHANGE UP (ref 1–3.3)
LYMPHOCYTES # BLD AUTO: 30.3 % — SIGNIFICANT CHANGE UP (ref 13–44)
MCHC RBC-ENTMCNC: 24.8 PG — LOW (ref 27–34)
MCHC RBC-ENTMCNC: 30.9 G/DL — LOW (ref 32–36)
MCV RBC AUTO: 80.4 FL — SIGNIFICANT CHANGE UP (ref 80–100)
MONOCYTES # BLD AUTO: 0.32 K/UL — SIGNIFICANT CHANGE UP (ref 0–0.9)
MONOCYTES NFR BLD AUTO: 5.6 % — SIGNIFICANT CHANGE UP (ref 2–14)
NEUTROPHILS # BLD AUTO: 3.3 K/UL — SIGNIFICANT CHANGE UP (ref 1.8–7.4)
NEUTROPHILS NFR BLD AUTO: 58.2 % — SIGNIFICANT CHANGE UP (ref 43–77)
NRBC # BLD: 0 /100 WBCS — SIGNIFICANT CHANGE UP (ref 0–0)
PLATELET # BLD AUTO: 246 K/UL — SIGNIFICANT CHANGE UP (ref 150–400)
POTASSIUM SERPL-SCNC: 4.7 MMOL/L
PROT SERPL-MCNC: 7.4 G/DL
RBC # BLD: 4.79 M/UL — SIGNIFICANT CHANGE UP (ref 3.8–5.2)
RBC # FLD: 24.9 % — HIGH (ref 10.3–14.5)
SODIUM SERPL-SCNC: 140 MMOL/L
TIBC SERPL-MCNC: 327 UG/DL
UIBC SERPL-MCNC: 279 UG/DL
VIT B12 SERPL-MCNC: 927 PG/ML
WBC # BLD: 5.67 K/UL — SIGNIFICANT CHANGE UP (ref 3.8–10.5)
WBC # FLD AUTO: 5.67 K/UL — SIGNIFICANT CHANGE UP (ref 3.8–10.5)

## 2021-03-25 PROCEDURE — 99204 OFFICE O/P NEW MOD 45 MIN: CPT

## 2021-03-25 PROCEDURE — 99072 ADDL SUPL MATRL&STAF TM PHE: CPT

## 2021-03-25 RX ORDER — CHLORHEXIDINE GLUCONATE 4 %
325 (65 FE) LIQUID (ML) TOPICAL
Refills: 0 | Status: ACTIVE | COMMUNITY

## 2021-03-25 NOTE — REVIEW OF SYSTEMS
[Fatigue] : fatigue [Joint Pain] : joint pain [Joint Stiffness] : joint stiffness [Dizziness] : dizziness [Hot Flashes] : hot flashes [Swollen Glands] : swollen glands [Negative] : Allergic/Immunologic

## 2021-03-26 ENCOUNTER — NON-APPOINTMENT (OUTPATIENT)
Age: 51
End: 2021-03-26

## 2021-03-26 LAB
ALBUMIN MFR SERPL ELPH: 52.9 %
ALBUMIN SERPL-MCNC: 3.9 G/DL
ALBUMIN/GLOB SERPL: 1.1 RATIO
ALPHA1 GLOB MFR SERPL ELPH: 3.4 %
ALPHA1 GLOB SERPL ELPH-MCNC: 0.3 G/DL
ALPHA2 GLOB MFR SERPL ELPH: 8.2 %
ALPHA2 GLOB SERPL ELPH-MCNC: 0.6 G/DL
B-GLOBULIN MFR SERPL ELPH: 11.8 %
B-GLOBULIN SERPL ELPH-MCNC: 0.9 G/DL
GAMMA GLOB FLD ELPH-MCNC: 1.8 G/DL
GAMMA GLOB MFR SERPL ELPH: 23.7 %
INTERPRETATION SERPL IEP-IMP: NORMAL
PROT SERPL-MCNC: 7.4 G/DL
PROT SERPL-MCNC: 7.4 G/DL

## 2021-03-29 ENCOUNTER — NON-APPOINTMENT (OUTPATIENT)
Age: 51
End: 2021-03-29

## 2021-03-30 ENCOUNTER — NON-APPOINTMENT (OUTPATIENT)
Age: 51
End: 2021-03-30

## 2021-03-30 ENCOUNTER — OUTPATIENT (OUTPATIENT)
Dept: OUTPATIENT SERVICES | Facility: HOSPITAL | Age: 51
LOS: 1 days | End: 2021-03-30
Payer: COMMERCIAL

## 2021-03-30 VITALS
OXYGEN SATURATION: 98 % | RESPIRATION RATE: 20 BRPM | HEIGHT: 65 IN | TEMPERATURE: 97 F | HEART RATE: 62 BPM | WEIGHT: 150.36 LBS | DIASTOLIC BLOOD PRESSURE: 82 MMHG | SYSTOLIC BLOOD PRESSURE: 133 MMHG

## 2021-03-30 DIAGNOSIS — Z98.890 OTHER SPECIFIED POSTPROCEDURAL STATES: Chronic | ICD-10-CM

## 2021-03-30 DIAGNOSIS — N81.11 CYSTOCELE, MIDLINE: ICD-10-CM

## 2021-03-30 DIAGNOSIS — Z29.9 ENCOUNTER FOR PROPHYLACTIC MEASURES, UNSPECIFIED: ICD-10-CM

## 2021-03-30 DIAGNOSIS — N81.2 INCOMPLETE UTEROVAGINAL PROLAPSE: ICD-10-CM

## 2021-03-30 DIAGNOSIS — N81.6 RECTOCELE: ICD-10-CM

## 2021-03-30 DIAGNOSIS — Z13.89 ENCOUNTER FOR SCREENING FOR OTHER DISORDER: ICD-10-CM

## 2021-03-30 DIAGNOSIS — Z98.51 TUBAL LIGATION STATUS: Chronic | ICD-10-CM

## 2021-03-30 DIAGNOSIS — D64.9 ANEMIA, UNSPECIFIED: ICD-10-CM

## 2021-03-30 DIAGNOSIS — Z29.8 ENCOUNTER FOR OTHER SPECIFIED PROPHYLACTIC MEASURES: ICD-10-CM

## 2021-03-30 DIAGNOSIS — Z01.818 ENCOUNTER FOR OTHER PREPROCEDURAL EXAMINATION: ICD-10-CM

## 2021-03-30 LAB
A1C WITH ESTIMATED AVERAGE GLUCOSE RESULT: 5 % — SIGNIFICANT CHANGE UP (ref 4–5.6)
ALBUPE: 14.5 %
ALPHA1UPE: 34 %
ALPHA2UPE: 20.8 %
ANION GAP SERPL CALC-SCNC: 7 MMOL/L — SIGNIFICANT CHANGE UP (ref 5–17)
ANISOCYTOSIS BLD QL: SLIGHT — SIGNIFICANT CHANGE UP
APPEARANCE UR: CLEAR — SIGNIFICANT CHANGE UP
APTT BLD: 37.8 SEC — HIGH (ref 27.5–35.5)
BACTERIA # UR AUTO: ABNORMAL
BASOPHILS # BLD AUTO: 0.15 K/UL — SIGNIFICANT CHANGE UP (ref 0–0.2)
BASOPHILS NFR BLD AUTO: 3.5 % — HIGH (ref 0–2)
BETAUPE: 18.6 %
BILIRUB UR-MCNC: NEGATIVE — SIGNIFICANT CHANGE UP
BLD GP AB SCN SERPL QL: SIGNIFICANT CHANGE UP
BUN SERPL-MCNC: 15 MG/DL — SIGNIFICANT CHANGE UP (ref 8–20)
CALCIUM SERPL-MCNC: 8.5 MG/DL — LOW (ref 8.6–10.2)
CHLORIDE SERPL-SCNC: 104 MMOL/L — SIGNIFICANT CHANGE UP (ref 98–107)
CO2 SERPL-SCNC: 27 MMOL/L — SIGNIFICANT CHANGE UP (ref 22–29)
COLOR SPEC: YELLOW — SIGNIFICANT CHANGE UP
CREAT 24H UR-MCNC: NORMAL G/24 H
CREAT SERPL-MCNC: 0.67 MG/DL — SIGNIFICANT CHANGE UP (ref 0.5–1.3)
CREATININE UR (MAYO): 128 MG/DL
DIFF PNL FLD: ABNORMAL
ELLIPTOCYTES BLD QL SMEAR: SLIGHT — SIGNIFICANT CHANGE UP
EOSINOPHIL # BLD AUTO: 0.22 K/UL — SIGNIFICANT CHANGE UP (ref 0–0.5)
EOSINOPHIL NFR BLD AUTO: 5.2 % — SIGNIFICANT CHANGE UP (ref 0–6)
EPI CELLS # UR: SIGNIFICANT CHANGE UP
ESTIMATED AVERAGE GLUCOSE: 97 MG/DL — SIGNIFICANT CHANGE UP (ref 68–114)
GAMMAUPE: 12.1 %
GIANT PLATELETS BLD QL SMEAR: PRESENT — SIGNIFICANT CHANGE UP
GLUCOSE SERPL-MCNC: 83 MG/DL — SIGNIFICANT CHANGE UP (ref 70–99)
GLUCOSE UR QL: NEGATIVE MG/DL — SIGNIFICANT CHANGE UP
HCG SERPL-ACNC: <4 MIU/ML — SIGNIFICANT CHANGE UP
HCT VFR BLD CALC: 40 % — SIGNIFICANT CHANGE UP (ref 34.5–45)
HGB BLD-MCNC: 12.2 G/DL — SIGNIFICANT CHANGE UP (ref 11.5–15.5)
IGA 24H UR QL IFE: NORMAL
INR BLD: 1.11 RATIO — SIGNIFICANT CHANGE UP (ref 0.88–1.16)
KAPPA LC 24H UR QL: NORMAL
KETONES UR-MCNC: NEGATIVE — SIGNIFICANT CHANGE UP
LEUKOCYTE ESTERASE UR-ACNC: NEGATIVE — SIGNIFICANT CHANGE UP
LYMPHOCYTES # BLD AUTO: 1.49 K/UL — SIGNIFICANT CHANGE UP (ref 1–3.3)
LYMPHOCYTES # BLD AUTO: 34.5 % — SIGNIFICANT CHANGE UP (ref 13–44)
MANUAL SMEAR VERIFICATION: SIGNIFICANT CHANGE UP
MCHC RBC-ENTMCNC: 24.9 PG — LOW (ref 27–34)
MCHC RBC-ENTMCNC: 30.5 GM/DL — LOW (ref 32–36)
MCV RBC AUTO: 81.6 FL — SIGNIFICANT CHANGE UP (ref 80–100)
MONOCYTES # BLD AUTO: 0.26 K/UL — SIGNIFICANT CHANGE UP (ref 0–0.9)
MONOCYTES NFR BLD AUTO: 6 % — SIGNIFICANT CHANGE UP (ref 2–14)
NEUTROPHILS # BLD AUTO: 2.12 K/UL — SIGNIFICANT CHANGE UP (ref 1.8–7.4)
NEUTROPHILS NFR BLD AUTO: 47.4 % — SIGNIFICANT CHANGE UP (ref 43–77)
NEUTS BAND # BLD: 1.7 % — SIGNIFICANT CHANGE UP (ref 0–8)
NITRITE UR-MCNC: NEGATIVE — SIGNIFICANT CHANGE UP
PH UR: 5 — SIGNIFICANT CHANGE UP (ref 5–8)
PLAT MORPH BLD: NORMAL — SIGNIFICANT CHANGE UP
PLATELET # BLD AUTO: 232 K/UL — SIGNIFICANT CHANGE UP (ref 150–400)
POLYCHROMASIA BLD QL SMEAR: SIGNIFICANT CHANGE UP
POTASSIUM SERPL-MCNC: 4.1 MMOL/L — SIGNIFICANT CHANGE UP (ref 3.5–5.3)
POTASSIUM SERPL-SCNC: 4.1 MMOL/L — SIGNIFICANT CHANGE UP (ref 3.5–5.3)
PROT PATTERN 24H UR ELPH-IMP: NORMAL
PROT UR-MCNC: 17 MG/DL
PROT UR-MCNC: 17 MG/DL
PROT UR-MCNC: NEGATIVE MG/DL — SIGNIFICANT CHANGE UP
PROTHROM AB SERPL-ACNC: 12.8 SEC — SIGNIFICANT CHANGE UP (ref 10.6–13.6)
RBC # BLD: 4.9 M/UL — SIGNIFICANT CHANGE UP (ref 3.8–5.2)
RBC # FLD: 24.5 % — HIGH (ref 10.3–14.5)
RBC BLD AUTO: ABNORMAL
RBC CASTS # UR COMP ASSIST: SIGNIFICANT CHANGE UP /HPF (ref 0–4)
SODIUM SERPL-SCNC: 138 MMOL/L — SIGNIFICANT CHANGE UP (ref 135–145)
SP GR SPEC: 1.02 — SIGNIFICANT CHANGE UP (ref 1.01–1.02)
SPECIMEN VOL 24H UR: NORMAL ML
TARGETS BLD QL SMEAR: SLIGHT — SIGNIFICANT CHANGE UP
URATE CRY FLD QL MICRO: ABNORMAL
UROBILINOGEN FLD QL: NEGATIVE MG/DL — SIGNIFICANT CHANGE UP
VARIANT LYMPHS # BLD: 1.7 % — SIGNIFICANT CHANGE UP (ref 0–6)
WBC # BLD: 4.32 K/UL — SIGNIFICANT CHANGE UP (ref 3.8–10.5)
WBC # FLD AUTO: 4.32 K/UL — SIGNIFICANT CHANGE UP (ref 3.8–10.5)
WBC UR QL: SIGNIFICANT CHANGE UP

## 2021-03-30 PROCEDURE — 93010 ELECTROCARDIOGRAM REPORT: CPT

## 2021-03-30 PROCEDURE — G0463: CPT

## 2021-03-30 PROCEDURE — 93005 ELECTROCARDIOGRAM TRACING: CPT

## 2021-03-30 RX ORDER — SODIUM CHLORIDE 9 MG/ML
3 INJECTION INTRAMUSCULAR; INTRAVENOUS; SUBCUTANEOUS EVERY 8 HOURS
Refills: 0 | Status: DISCONTINUED | OUTPATIENT
Start: 2021-04-07 | End: 2021-04-07

## 2021-03-30 RX ORDER — CEFAZOLIN SODIUM 1 G
2000 VIAL (EA) INJECTION ONCE
Refills: 0 | Status: DISCONTINUED | OUTPATIENT
Start: 2021-04-07 | End: 2021-04-08

## 2021-03-30 NOTE — PHYSICAL EXAM
[Restricted in physically strenuous activity but ambulatory and able to carry out work of a light or sedentary nature] : Status 1- Restricted in physically strenuous activity but ambulatory and able to carry out work of a light or sedentary nature, e.g., light house work, office work [Normal] : affect appropriate [de-identified] : positive for 1-2 cm enlarged lymph node left level 2 cervical node.  [de-identified] : positive for 1-2 cm enlarged lymph node left level 2 cervical node.

## 2021-03-30 NOTE — H&P PST ADULT - NSICDXPROBLEM_GEN_ALL_CORE_FT
PROBLEM DIAGNOSES  Problem: Need for prophylactic measure  Assessment and Plan: CAP score 4 patient Moderate Risk,  SCDs ordered for day of procedure.  Surgical team to assess need for VTE prophylaxis    Problem: Anemia  Assessment and Plan: Labs drawn today, results to be faxed tp PCP for review, pending results disscussed with patient possible need for hematology clearance.     Problem: Screening for substance abuse  Assessment and Plan: ORT score 0 patient low risk     Problem: Cystocele, midline  Assessment and Plan: Scheduled for robotic assisted total hyterectomy, uterosacral ligament suspension, possible anterior and posterior repair, cystoscopy with Dr. Pimentel on 4/7/21 pending medical clearance.     Problem: Rectocele  Assessment and Plan: Scheduled for robotic assisted total hysterectomy, uterosacral ligament suspension, possible anterior and posterior repair, cystoscopy with Dr. Pimentel on 4/7/21 pending medical clearance.     Problem: Incomplete uterovaginal prolapse  Assessment and Plan: Scheduled for robotic assisted total hysterectomy, uterosacral ligament suspension, possible anterior and posterior repair, cystoscopy with Dr. Pimentel on 4/7/21 pending medical clearance.

## 2021-03-30 NOTE — CONSULT LETTER
[Dear  ___] : Dear  [unfilled], [Consult Letter:] : I had the pleasure of evaluating your patient, [unfilled]. [Please see my note below.] : Please see my note below. [Consult Closing:] : Thank you very much for allowing me to participate in the care of this patient.  If you have any questions, please do not hesitate to contact me. [Sincerely,] : Sincerely, [FreeTextEntry2] : Dr Enrrique Herrera

## 2021-03-30 NOTE — H&P PST ADULT - NSICDXPASTSURGICALHX_GEN_ALL_CORE_FT
PAST SURGICAL HISTORY:  H/O breast biopsy     H/O colonoscopy 1/22/20    H/O surgical biopsy sacral bone    H/O tubal ligation     History of ankle surgery right

## 2021-03-30 NOTE — H&P PST ADULT - ATTENDING COMMENTS
Patient seen, continues to desire surg correction of POP and fibroid uterus, declines nonsurg intervention such as observation, kegels/PT, pessary use. Declines nonsurg med options for fibroid ut management. Risks of surg including but not limited to bleeding, hematoma formation, transfusion, hemorrhage, infections, exposure or erosion of materials, fistula formation, SBO, cardiopulmonary arrest, MI, stroke, anesthesia risks, need for further surgery, damage to surrounding structures such as bowel / ureters / bladder / nerves / vessels / rectum, urinary incontinence, OAB, going home with a catheter all reviewed, need for laparotomy reviewed. All ques answered. She would like to proceed with RATLH / BS / USLS / cysto / other indicated procedures. Consent signed and witnessed in office, reviewed now.

## 2021-03-30 NOTE — H&P PST ADULT - MUSCULOSKELETAL
details… left lower extremity/ROM intact/no joint swelling/no joint erythema/no joint warmth/no calf tenderness/diminished strength detailed exam

## 2021-03-30 NOTE — H&P PST ADULT - NEGATIVE ENMT SYMPTOMS
no hearing difficulty/no ear pain/no tinnitus/no vertigo/no sinus symptoms/no nasal congestion/no nasal discharge/no nose bleeds/no dry mouth/no throat pain/no dysphagia

## 2021-03-30 NOTE — H&P PST ADULT - NSICDXPASTMEDICALHX_GEN_ALL_CORE_FT
PAST MEDICAL HISTORY:  Anemia     Complete uterovaginal prolapse     Urinary urgency     Uterine leiomyoma

## 2021-03-30 NOTE — H&P PST ADULT - ASSESSMENT
50 year old  female with history of uterine vaginal prolapse, fibroids and anemia present today for PST c/o  "vaginal bulge" for 1 year. She is now scheduled for robotic assisted total hysterectomy, uterosacral ligament suspension, possible anterior and posterior repair, cystoscopy on 21 with Dr. Fulton pending medical clearance.   Patient to have medical clearance with Dr. Echevarria  Patient instructed to stop ASA/Herbals or anti-inflammatory meds one week prior to surgery and discuss with PCP.  Patient educated on surgical scrub, COVID testing, preadmission instructions, and medical clearance,  verbalizes understanding.    CAPRINI SCORE    AGE RELATED RISK FACTORS                                                             [X ] Age 41-60 years                                            (1 Point)  [ ] Age: 61-74 years                                           (2 Points)                 [ ] Age= 75 years                                                (3 Points)             DISEASE RELATED RISK FACTORS                                                       [ ] Edema in the lower extremities                 (1 Point)                     [ ] Varicose veins                                               (1 Point)                                 [X ] BMI > 25 Kg/m2                                            (1 Point)                                  [ ] Serious infection (ie PNA)                            (1 Point)                     [ ] Lung disease ( COPD, Emphysema)            (1 Point)                                                                          [ ] Acute myocardial infarction                         (1 Point)                  [ ] Congestive heart failure (in the previous month)  (1 Point)         [ ] Inflammatory bowel disease                            (1 Point)                  [ ] Central venous access, PICC or Port               (2 points)       (within the last month)                                                                [ ] Stroke (in the previous month)                        (5 Points)    [ ] Previous or present malignancy                       (2 points)                                                                                                                                                         HEMATOLOGY RELATED FACTORS                                                         [ ] Prior episodes of VTE                                     (3 Points)                     [ ] Positive family history for VTE                      (3 Points)                  [ ] Prothrombin 83266 A                                     (3 Points)                     [ ] Factor V Leiden                                                (3 Points)                        [ ] Lupus anticoagulants                                      (3 Points)                                                           [ ] Anticardiolipin antibodies                              (3 Points)                                                       [ ] High homocysteine in the blood                   (3 Points)                                             [ ] Other congenital or acquired thrombophilia      (3 Points)                                                [ ] Heparin induced thrombocytopenia                  (3 Points)                                        MOBILITY RELATED FACTORS  [ ] Bed rest                                                         (1 Point)  [ ] Plaster cast                                                    (2 points)  [ ] Bed bound for more than 72 hours           (2 Points)    GENDER SPECIFIC FACTORS  [ ] Pregnancy or had a baby within the last month   (1 Point)  [ ] Post-partum < 6 weeks                                   (1 Point)  [ ] Hormonal therapy  or oral contraception   (1 Point)  [ ] History of pregnancy complications              (1 point)  [ ] Unexplained or recurrent              (1 Point)    OTHER RISK FACTORS                                           (1 Point)  [ ] BMI >40, smoking, diabetes requiring insulin, chemotherapy  blood transfusions and length of surgery over 2 hours    SURGERY RELATED RISK FACTORS  [ ]  Section within the last month     (1 Point)  [ ] Minor surgery                                                  (1 Point)  [ ] Arthroscopic surgery                                       (2 Points)  [X ] Planned major surgery lasting more            (2 Points)      than 45 minutes     [ ] Elective hip or knee joint replacement       (5 points)       surgery                                                TRAUMA RELATED RISK FACTORS  [ ] Fracture of the hip, pelvis, or leg                       (5 Points)  [ ] Spinal cord injury resulting in paralysis             (5 points)       (in the previous month)    [ ] Paralysis  (less than 1 month)                             (5 Points)  [ ] Multiple Trauma within 1 month                        (5 Points)    Total Score [   4     ]    Caprini Score 0-2: Low Risk, NO VTE prophylaxis required for most patients, encourage ambulation  Caprini Score 3-6: Moderate Risk , pharmacologic VTE prophylaxis is indicated for most patients (in the absence of contraindications)  Caprini Score Greater than or =7: High risk, pharmocologic VTE prophylaxis indicated for most patients (in the absence of contraindications)    OPIOID RISK TOOL    ALINA EACH BOX THAT APPLIES AND ADD TOTALS AT THE END    FAMILY HISTORY OF SUBSTANCE ABUSE                 FEMALE         MALE                                                Alcohol                             [  ]1 pt          [  ]3pts                                               Illegal Durgs                     [  ]2 pts        [  ]3pts                                               Rx Drugs                           [  ]4 pts        [  ]4 pts    PERSONAL HISTORY OF SUBSTANCE ABUSE                                                                                          Alcohol                             [  ]3 pts       [  ]3 pts                                               Illegal Drugs                     [  ]4 pts        [  ]4 pts                                               Rx Drugs                           [  ]5 pts        [  ]5 pts    AGE BETWEEN 16-45 YEARS                                      [  ]1 pt         [  ]1 pt    HISTORY OF PREADOLESCENT   SEXUAL ABUSE                                                             [  ]3 pts        [  ]0pts    PSYCHOLOGICAL DISEASE                     ADD, OCD, Bipolar, Schizophrenia        [  ]2 pts         [  ]2 pts                      Depression                                               [  ]1 pt           [  ]1 pt           SCORING TOTAL   (add numbers and type here)              (**0*)                                     A score of 3 or lower indicated LOW risk for future opioid abuse  A score of 4 to 7 indicated moderate risk for future opioid abuse  A score of 8 or higher indicates a high risk for opioid abuse                                         50 year old  female with history of uterine vaginal prolapse, fibroids and anemia present today for PST c/o  "vaginal bulge" for 1 year. She is now scheduled for robotic assisted total hysterectomy, uterosacral ligament suspension, possible anterior and posterior repair, cystoscopy on 21 with Dr. Fulton pending medical clearance.   Patient to have medical clearance with Dr. Echevarria  Patient to have hematology with Dr. Estrada  Patient instructed to stop ASA/Herbals or anti-inflammatory meds one week prior to surgery and discuss with PCP.  Patient educated on surgical scrub, COVID testing, preadmission instructions, and medical clearance,  verbalizes understanding.    CAPRINI SCORE    AGE RELATED RISK FACTORS                                                             [X ] Age 41-60 years                                            (1 Point)  [ ] Age: 61-74 years                                           (2 Points)                 [ ] Age= 75 years                                                (3 Points)             DISEASE RELATED RISK FACTORS                                                       [ ] Edema in the lower extremities                 (1 Point)                     [ ] Varicose veins                                               (1 Point)                                 [X ] BMI > 25 Kg/m2                                            (1 Point)                                  [ ] Serious infection (ie PNA)                            (1 Point)                     [ ] Lung disease ( COPD, Emphysema)            (1 Point)                                                                          [ ] Acute myocardial infarction                         (1 Point)                  [ ] Congestive heart failure (in the previous month)  (1 Point)         [ ] Inflammatory bowel disease                            (1 Point)                  [ ] Central venous access, PICC or Port               (2 points)       (within the last month)                                                                [ ] Stroke (in the previous month)                        (5 Points)    [ ] Previous or present malignancy                       (2 points)                                                                                                                                                         HEMATOLOGY RELATED FACTORS                                                         [ ] Prior episodes of VTE                                     (3 Points)                     [ ] Positive family history for VTE                      (3 Points)                  [ ] Prothrombin 10390 A                                     (3 Points)                     [ ] Factor V Leiden                                                (3 Points)                        [ ] Lupus anticoagulants                                      (3 Points)                                                           [ ] Anticardiolipin antibodies                              (3 Points)                                                       [ ] High homocysteine in the blood                   (3 Points)                                             [ ] Other congenital or acquired thrombophilia      (3 Points)                                                [ ] Heparin induced thrombocytopenia                  (3 Points)                                        MOBILITY RELATED FACTORS  [ ] Bed rest                                                         (1 Point)  [ ] Plaster cast                                                    (2 points)  [ ] Bed bound for more than 72 hours           (2 Points)    GENDER SPECIFIC FACTORS  [ ] Pregnancy or had a baby within the last month   (1 Point)  [ ] Post-partum < 6 weeks                                   (1 Point)  [ ] Hormonal therapy  or oral contraception   (1 Point)  [ ] History of pregnancy complications              (1 point)  [ ] Unexplained or recurrent              (1 Point)    OTHER RISK FACTORS                                           (1 Point)  [ ] BMI >40, smoking, diabetes requiring insulin, chemotherapy  blood transfusions and length of surgery over 2 hours    SURGERY RELATED RISK FACTORS  [ ]  Section within the last month     (1 Point)  [ ] Minor surgery                                                  (1 Point)  [ ] Arthroscopic surgery                                       (2 Points)  [X ] Planned major surgery lasting more            (2 Points)      than 45 minutes     [ ] Elective hip or knee joint replacement       (5 points)       surgery                                                TRAUMA RELATED RISK FACTORS  [ ] Fracture of the hip, pelvis, or leg                       (5 Points)  [ ] Spinal cord injury resulting in paralysis             (5 points)       (in the previous month)    [ ] Paralysis  (less than 1 month)                             (5 Points)  [ ] Multiple Trauma within 1 month                        (5 Points)    Total Score [   4     ]    Caprini Score 0-2: Low Risk, NO VTE prophylaxis required for most patients, encourage ambulation  Caprini Score 3-6: Moderate Risk , pharmacologic VTE prophylaxis is indicated for most patients (in the absence of contraindications)  Caprini Score Greater than or =7: High risk, pharmocologic VTE prophylaxis indicated for most patients (in the absence of contraindications)    OPIOID RISK TOOL    ALINA EACH BOX THAT APPLIES AND ADD TOTALS AT THE END    FAMILY HISTORY OF SUBSTANCE ABUSE                 FEMALE         MALE                                                Alcohol                             [  ]1 pt          [  ]3pts                                               Illegal Durgs                     [  ]2 pts        [  ]3pts                                               Rx Drugs                           [  ]4 pts        [  ]4 pts    PERSONAL HISTORY OF SUBSTANCE ABUSE                                                                                          Alcohol                             [  ]3 pts       [  ]3 pts                                               Illegal Drugs                     [  ]4 pts        [  ]4 pts                                               Rx Drugs                           [  ]5 pts        [  ]5 pts    AGE BETWEEN 16-45 YEARS                                      [  ]1 pt         [  ]1 pt    HISTORY OF PREADOLESCENT   SEXUAL ABUSE                                                             [  ]3 pts        [  ]0pts    PSYCHOLOGICAL DISEASE                     ADD, OCD, Bipolar, Schizophrenia        [  ]2 pts         [  ]2 pts                      Depression                                               [  ]1 pt           [  ]1 pt           SCORING TOTAL   (add numbers and type here)              (**0*)                                     A score of 3 or lower indicated LOW risk for future opioid abuse  A score of 4 to 7 indicated moderate risk for future opioid abuse  A score of 8 or higher indicates a high risk for opioid abuse

## 2021-03-30 NOTE — PATIENT PROFILE ADULT - NSPREOP1_ABLETOREACHPT_GEN_A_NUR
[phone]    Denies domestic or international travel in the past 3 weeks [phone]    Denies domestic or international travel in the past 3 weeks/yes

## 2021-03-30 NOTE — ASSESSMENT
[FreeTextEntry1] : 49 yo woman with PMhx of stage II uterine prolapse, referred for evaluation of  anemia and a  3.9 cm lesion abutting the superior left side of the uterus which may indicate partially exophytic degenerating uterine myoma versus dilated bowel loop or adnexal cyst. Documented on a CT scan on 8/3/2020. \par \par MRI- 7/31/20 1.2x1.3cm enhancing lesion left side of sacrum. \par \par Bone sacrum left CT guided biopsy 9/25/20 c/w cellular bone marrow with progressive trilineage hematopiesis and mild plasmacytosis. \par \par Patient has been evaluated by Dr Ana Fulton GYN Uro for hysterectomy. \par \par Patient was also referred for anemia, history of fibroids and anemia. \par \par 2/5/21- WBC 4.5, Hb 9.9 g/dl, Hct 33.3%, MCV 71.5%, RDW 23.4%, . \par \par Patient complaints of joint pain, stiffness, localized to most joints specially elbow, knees and lower back for approx 1 year. Patient is scheduled for hysterectomy on 4/7/21. She has irregulars periods, last one was on January 2021, bled for one month. Patient feels fatigue, lightheadedness, denies palpitations, chest pains, denies sob with exertion. She is on iron tablets since 2/2021. \par Denies melena or hematochezia. \par \par I had a detailed discussion today with the patient regarding the natural history, epidemiology, diagnosis, and treatment of anemia. I reviewed her radiological and laboratory  studies in detail today. I then discussed treatment for iron deficiency anemia. Complete anemia work up was sent today and MM panel to r/o plasma cell dyscrasia.     I reviewed with patient the benefits versus risks of therapy. \par \par Patient also has cervical lymphadenopathy and a sacral lesion will order a PET/CT to r/o malignancy.\par \par I answered all her questions to satisfaction.\par \par RTC 8 weeks. \par \par

## 2021-03-30 NOTE — H&P PST ADULT - HISTORY OF PRESENT ILLNESS
50 year old  female with history of present today for PST c/o  for 1 year. Patient states she was supposed to have hysterectomy last year but during her MRI there was a incidental finding on her sacral bone. She was followed up by orthopedics. After work-up findings were inconclusive and is currently pending a Pet scan ordered by hematology. Patient reports a year ago she was experiencing heavy vaginal bleeding and pelvic pain, but states after 2020 she stopped bleeding and pain was gone. States in November and 2020 no menses. In January 15, 2021 states had light vaginal bleeding for 3 days, but nothing in February or March.  Reports bulging prolapse, which is causing her discomfort. Reports nocturia. Denies chest pain, palpitations, peripheral edema or shortness of breath. Saline sonogram  with polyp.  50 year old  female with history of uterine vaginal prolapse, fibroids and anemia present today for PST c/o  "vaginal bulge" for 1 year. Patient states "vaginal bulge" has been ongoing since 2020.  Patient states she was supposed to have hysterectomy last year but during her MRI there was a incidental finding on her sacral bone. She was advised to follow-up with orthopedics.  Patient states after her orthopedic  work-up findings were inconclusive and referred to hematology. Patient  is currently pending a Pet scan ordered by hematology. Patient reports a year ago she was experiencing heavy vaginal bleeding and pelvic pain, but states after 2020 she stopped bleeding and pain was gone. States in November and 2020 no menses. In January 15, 2021 states had light vaginal bleeding for 3 days, but nothing in February or March.  Reports bulging uterine vaginal prolapse, which is causing her "discomfort". Denies incomplete emptying of the bladder, incontinence, dysuria or hematuria.  Reports nocturia and urinary urgency. Denies chest pain, palpitations, peripheral edema or shortness of breath. She is now scheduled for robotic assisted total hysterectomy, uterosacral ligament suspension, possible anterior and posterior repair, cystoscopy on 21 with Dr. Fulton pending medical clearance.  50 year old  female with history of uterine vaginal prolapse, fibroids and anemia present today for PST c/o  "vaginal bulge" for 1 year. Patient states "vaginal bulge" has been ongoing since 2020.  Patient states she was supposed to have hysterectomy last year but during her MRI there was a incidental finding on her sacral bone. She was advised to follow-up with orthopedics.  Patient states after her orthopedic  work-up findings were inconclusive and referred to hematology. Patient  is currently pending a Pet scan ordered by hematology. Patient reports a year ago she was experiencing heavy vaginal bleeding and pelvic pain, but states after 2020 she stopped bleeding and pain was gone. States in November and 2020 no menses. In January 15, 2021 states had light vaginal bleeding for 3 days, but nothing in February or March.  Reports bulging uterine vaginal prolapse, which is causing her "discomfort". Denies incomplete emptying of the bladder, incontinence, dysuria or hematuria.  Reports nocturia and urinary urgency. Denies chest pain, palpitations, peripheral edema or shortness of breath. She is now scheduled for robotic assisted total hysterectomy, uterosacral ligament suspension, possible anterior and posterior repair, cystoscopy on 21 with Dr. Fulton pending medical clearance and hematology.

## 2021-03-30 NOTE — H&P PST ADULT - NSANTHOSAYNRD_GEN_A_CORE
No. NOHEMI screening performed.  STOP BANG Legend: 0-2 = LOW Risk; 3-4 = INTERMEDIATE Risk; 5-8 = HIGH Risk

## 2021-03-30 NOTE — ADDENDUM
[FreeTextEntry1] : Patient has hematology clearance for hysterectomy, blood levels are stable. Will continue work up for lytic lesion with a PET/CT.

## 2021-03-30 NOTE — H&P PST ADULT - LAB RESULTS AND INTERPRETATION
results pending Labs reviewed abnormal values noted, results to be faxed to PCP for review. Patient is pending medical and hematology clearance. Secure email sent to Dr. Puente's surgical whiteside to make her aware.

## 2021-03-30 NOTE — HISTORY OF PRESENT ILLNESS
[de-identified] : 51 yo woman with PMhx of stage II uterine prolapse, referred for evaluation of  anemia and a  3.9 cm lesion abutting the superior left side of the uterus which may indicate partially exophytic degenerating uterine myoma versus dilated bowel loop or adnexal cyst. Documented on a CT scan on 8/3/2020. \par \par MRI- 7/31/20 1.2x1.3cm enhancing lesion left side of sacrum. \par \par Bone sacrum left CT guided biopsy 9/25/20 c/w cellular bone marrow with progressive trilineage hematopiesis and mild plasmacytosis. \par \par Patient has been evaluated by Dr Ana Fulton GYN Uro for hysterectomy. \par \par Patient was also referred for anemia, history of fibroids and anemia. \par \par 2/5/21- WBC 4.5, Hb 9.9 g/dl, Hct 33.3%, MCV 71.5%, RDW 23.4%, . \par \par Patient complaints of joint pain, stiffness, localized to most joints specially elbow, knees and lower back for approx 1 year. Patient is scheduled for hysterectomy on 4/7/21. She has irregulars periods, last one was on January 2021, bled for one month. Patient feels fatigue, lightheadedness, denies palpitations, chest pains, denies sob with exertion. She is on iron tablets since 2/2021. \par Denies melena or hematochezia.

## 2021-03-30 NOTE — H&P PST ADULT - NSICDXFAMILYHX_GEN_ALL_CORE_FT
FAMILY HISTORY:  Family history of osteoarthritis, mother, sister  FH: hypertension, mother, sister, brother  FH: thyroid disease, sister, brother

## 2021-03-31 ENCOUNTER — RESULT REVIEW (OUTPATIENT)
Age: 51
End: 2021-03-31

## 2021-03-31 PROBLEM — D64.9 ANEMIA, UNSPECIFIED: Chronic | Status: ACTIVE | Noted: 2021-03-30

## 2021-03-31 LAB
CULTURE RESULTS: SIGNIFICANT CHANGE UP
SPECIMEN SOURCE: SIGNIFICANT CHANGE UP

## 2021-04-01 RX ORDER — OXYCODONE AND ACETAMINOPHEN 5; 325 MG/1; MG/1
5-325 TABLET ORAL
Qty: 12 | Refills: 0 | Status: ACTIVE | COMMUNITY
Start: 2021-04-01 | End: 1900-01-01

## 2021-04-02 ENCOUNTER — APPOINTMENT (OUTPATIENT)
Dept: NUCLEAR MEDICINE | Facility: IMAGING CENTER | Age: 51
End: 2021-04-02
Payer: COMMERCIAL

## 2021-04-02 ENCOUNTER — OUTPATIENT (OUTPATIENT)
Dept: OUTPATIENT SERVICES | Facility: HOSPITAL | Age: 51
LOS: 1 days | End: 2021-04-02
Payer: COMMERCIAL

## 2021-04-02 DIAGNOSIS — Z98.890 OTHER SPECIFIED POSTPROCEDURAL STATES: Chronic | ICD-10-CM

## 2021-04-02 DIAGNOSIS — Z00.8 ENCOUNTER FOR OTHER GENERAL EXAMINATION: ICD-10-CM

## 2021-04-02 DIAGNOSIS — M53.3 SACROCOCCYGEAL DISORDERS, NOT ELSEWHERE CLASSIFIED: ICD-10-CM

## 2021-04-02 DIAGNOSIS — Z98.51 TUBAL LIGATION STATUS: Chronic | ICD-10-CM

## 2021-04-02 PROCEDURE — 78815 PET IMAGE W/CT SKULL-THIGH: CPT | Mod: 26,PI

## 2021-04-02 PROCEDURE — A9552: CPT

## 2021-04-02 PROCEDURE — 78815 PET IMAGE W/CT SKULL-THIGH: CPT

## 2021-04-03 DIAGNOSIS — Z01.818 ENCOUNTER FOR OTHER PREPROCEDURAL EXAMINATION: ICD-10-CM

## 2021-04-04 ENCOUNTER — APPOINTMENT (OUTPATIENT)
Dept: DISASTER EMERGENCY | Facility: CLINIC | Age: 51
End: 2021-04-04

## 2021-04-04 LAB — SARS-COV-2 N GENE NPH QL NAA+PROBE: NOT DETECTED

## 2021-04-06 ENCOUNTER — TRANSCRIPTION ENCOUNTER (OUTPATIENT)
Age: 51
End: 2021-04-06

## 2021-04-06 ENCOUNTER — NON-APPOINTMENT (OUTPATIENT)
Age: 51
End: 2021-04-06

## 2021-04-07 ENCOUNTER — RESULT REVIEW (OUTPATIENT)
Age: 51
End: 2021-04-07

## 2021-04-07 ENCOUNTER — TRANSCRIPTION ENCOUNTER (OUTPATIENT)
Age: 51
End: 2021-04-07

## 2021-04-07 ENCOUNTER — INPATIENT (INPATIENT)
Facility: HOSPITAL | Age: 51
LOS: 0 days | Discharge: HOME | DRG: 743 | End: 2021-04-08
Attending: OBSTETRICS & GYNECOLOGY | Admitting: OBSTETRICS & GYNECOLOGY
Payer: COMMERCIAL

## 2021-04-07 ENCOUNTER — APPOINTMENT (OUTPATIENT)
Dept: UROGYNECOLOGY | Facility: HOSPITAL | Age: 51
End: 2021-04-07
Payer: COMMERCIAL

## 2021-04-07 VITALS
HEIGHT: 65 IN | SYSTOLIC BLOOD PRESSURE: 144 MMHG | TEMPERATURE: 98 F | OXYGEN SATURATION: 100 % | DIASTOLIC BLOOD PRESSURE: 95 MMHG | RESPIRATION RATE: 15 BRPM | HEART RATE: 70 BPM | WEIGHT: 149.91 LBS

## 2021-04-07 DIAGNOSIS — Z98.890 OTHER SPECIFIED POSTPROCEDURAL STATES: Chronic | ICD-10-CM

## 2021-04-07 DIAGNOSIS — Z98.51 TUBAL LIGATION STATUS: Chronic | ICD-10-CM

## 2021-04-07 DIAGNOSIS — N81.6 RECTOCELE: ICD-10-CM

## 2021-04-07 LAB
BLD GP AB SCN SERPL QL: SIGNIFICANT CHANGE UP
GLUCOSE BLDC GLUCOMTR-MCNC: 114 MG/DL — HIGH (ref 70–99)
GLUCOSE BLDC GLUCOMTR-MCNC: 77 MG/DL — SIGNIFICANT CHANGE UP (ref 70–99)

## 2021-04-07 PROCEDURE — 58552 LAPARO-VAG HYST INCL T/O: CPT | Mod: AS

## 2021-04-07 PROCEDURE — 57283 COLPOPEXY INTRAPERITONEAL: CPT | Mod: 59

## 2021-04-07 PROCEDURE — 57283 COLPOPEXY INTRAPERITONEAL: CPT | Mod: AS,59

## 2021-04-07 PROCEDURE — 88307 TISSUE EXAM BY PATHOLOGIST: CPT | Mod: 26

## 2021-04-07 PROCEDURE — 58552 LAPARO-VAG HYST INCL T/O: CPT

## 2021-04-07 RX ORDER — ONDANSETRON 8 MG/1
4 TABLET, FILM COATED ORAL ONCE
Refills: 0 | Status: DISCONTINUED | OUTPATIENT
Start: 2021-04-07 | End: 2021-04-07

## 2021-04-07 RX ORDER — HYDROMORPHONE HYDROCHLORIDE 2 MG/ML
0.5 INJECTION INTRAMUSCULAR; INTRAVENOUS; SUBCUTANEOUS
Refills: 0 | Status: DISCONTINUED | OUTPATIENT
Start: 2021-04-07 | End: 2021-04-07

## 2021-04-07 RX ORDER — ONDANSETRON 8 MG/1
8 TABLET, FILM COATED ORAL EVERY 8 HOURS
Refills: 0 | Status: DISCONTINUED | OUTPATIENT
Start: 2021-04-07 | End: 2021-04-08

## 2021-04-07 RX ORDER — SODIUM CHLORIDE 9 MG/ML
1000 INJECTION, SOLUTION INTRAVENOUS
Refills: 0 | Status: DISCONTINUED | OUTPATIENT
Start: 2021-04-07 | End: 2021-04-08

## 2021-04-07 RX ORDER — ACETAMINOPHEN 500 MG
975 TABLET ORAL EVERY 6 HOURS
Refills: 0 | Status: DISCONTINUED | OUTPATIENT
Start: 2021-04-07 | End: 2021-04-08

## 2021-04-07 RX ORDER — POLYETHYLENE GLYCOL 3350 17 G/17G
17 POWDER, FOR SOLUTION ORAL AT BEDTIME
Refills: 0 | Status: DISCONTINUED | OUTPATIENT
Start: 2021-04-07 | End: 2021-04-08

## 2021-04-07 RX ORDER — SODIUM CHLORIDE 9 MG/ML
1000 INJECTION, SOLUTION INTRAVENOUS
Refills: 0 | Status: DISCONTINUED | OUTPATIENT
Start: 2021-04-07 | End: 2021-04-07

## 2021-04-07 RX ORDER — OXYCODONE HYDROCHLORIDE 5 MG/1
10 TABLET ORAL EVERY 4 HOURS
Refills: 0 | Status: DISCONTINUED | OUTPATIENT
Start: 2021-04-07 | End: 2021-04-08

## 2021-04-07 RX ORDER — ENOXAPARIN SODIUM 100 MG/ML
40 INJECTION SUBCUTANEOUS ONCE
Refills: 0 | Status: COMPLETED | OUTPATIENT
Start: 2021-04-08 | End: 2021-04-08

## 2021-04-07 RX ORDER — OXYCODONE HYDROCHLORIDE 5 MG/1
5 TABLET ORAL
Refills: 0 | Status: DISCONTINUED | OUTPATIENT
Start: 2021-04-07 | End: 2021-04-08

## 2021-04-07 RX ORDER — CEFAZOLIN SODIUM 1 G
1000 VIAL (EA) INJECTION EVERY 8 HOURS
Refills: 0 | Status: COMPLETED | OUTPATIENT
Start: 2021-04-08 | End: 2021-04-08

## 2021-04-07 RX ORDER — FENTANYL CITRATE 50 UG/ML
50 INJECTION INTRAVENOUS
Refills: 0 | Status: DISCONTINUED | OUTPATIENT
Start: 2021-04-07 | End: 2021-04-07

## 2021-04-07 RX ORDER — KETOROLAC TROMETHAMINE 30 MG/ML
30 SYRINGE (ML) INJECTION EVERY 8 HOURS
Refills: 0 | Status: COMPLETED | OUTPATIENT
Start: 2021-04-07 | End: 2021-04-08

## 2021-04-07 RX ORDER — SIMETHICONE 80 MG/1
80 TABLET, CHEWABLE ORAL EVERY 6 HOURS
Refills: 0 | Status: DISCONTINUED | OUTPATIENT
Start: 2021-04-07 | End: 2021-04-08

## 2021-04-07 RX ADMIN — HYDROMORPHONE HYDROCHLORIDE 0.5 MILLIGRAM(S): 2 INJECTION INTRAMUSCULAR; INTRAVENOUS; SUBCUTANEOUS at 19:57

## 2021-04-07 RX ADMIN — HYDROMORPHONE HYDROCHLORIDE 0.5 MILLIGRAM(S): 2 INJECTION INTRAMUSCULAR; INTRAVENOUS; SUBCUTANEOUS at 19:28

## 2021-04-07 RX ADMIN — HYDROMORPHONE HYDROCHLORIDE 0.5 MILLIGRAM(S): 2 INJECTION INTRAMUSCULAR; INTRAVENOUS; SUBCUTANEOUS at 19:25

## 2021-04-07 RX ADMIN — Medication 975 MILLIGRAM(S): at 23:43

## 2021-04-07 RX ADMIN — HYDROMORPHONE HYDROCHLORIDE 0.5 MILLIGRAM(S): 2 INJECTION INTRAMUSCULAR; INTRAVENOUS; SUBCUTANEOUS at 20:30

## 2021-04-07 RX ADMIN — Medication 30 MILLIGRAM(S): at 19:58

## 2021-04-07 RX ADMIN — ONDANSETRON 8 MILLIGRAM(S): 8 TABLET, FILM COATED ORAL at 23:13

## 2021-04-07 RX ADMIN — HYDROMORPHONE HYDROCHLORIDE 0.5 MILLIGRAM(S): 2 INJECTION INTRAMUSCULAR; INTRAVENOUS; SUBCUTANEOUS at 19:58

## 2021-04-07 RX ADMIN — Medication 975 MILLIGRAM(S): at 23:13

## 2021-04-07 RX ADMIN — Medication 30 MILLIGRAM(S): at 19:28

## 2021-04-07 RX ADMIN — HYDROMORPHONE HYDROCHLORIDE 0.5 MILLIGRAM(S): 2 INJECTION INTRAMUSCULAR; INTRAVENOUS; SUBCUTANEOUS at 19:34

## 2021-04-07 NOTE — DISCHARGE NOTE PROVIDER - HOSPITAL COURSE
s/p LAURENCE [facility] BS USLS and cystoscopy. Uncomplicated hospital course. At the time of discharge patient was tolerating a regular diet, ambulating without assistance, voiding spontaneously and pain was well controlled with PRN medications. Patient aware of plan to follow up with her GYN after discharge.

## 2021-04-07 NOTE — DISCHARGE NOTE PROVIDER - CARE PROVIDER_API CALL
Ana Fulton)  Female Pelvic MedReconst Surg; Obstetrics and Gynecology  [address], [address]  [place] [address]  Phone: [phone]  Fax: [phone]  Follow Up Time:

## 2021-04-07 NOTE — BRIEF OPERATIVE NOTE - OPERATION/FINDINGS
12-14wk sized uterus with multiple intramural fibroids. Portions of left and right fallopian tubes surgically absent.  On cystoscopy, normal bladder mucosa. No suture, injury, or foreign body noted. Bilateral ureteral orifices identified and effluxing clear yellow urine.  On rectal exam, no suture or injury.

## 2021-04-07 NOTE — DISCHARGE NOTE PROVIDER - NSDCFUSCHEDAPPT_GEN_ALL_CORE_FT
[facility] ; 04/28/2021 ; MYRA [facility] [address] [facility] ; 04/29/2021 ; MYRA [facility] ; 05/21/2021 ; MYRA Urogyn [address] [facility]

## 2021-04-07 NOTE — BRIEF OPERATIVE NOTE - NSICDXBRIEFPOSTOP_GEN_ALL_CORE_FT
POST-OP DIAGNOSIS:  Fibroid uterus 07-Apr-2021 18:57:03  Ely Alanis  Incomplete uterovaginal prolapse 07-Apr-2021 18:56:46  Ely Alanis

## 2021-04-07 NOTE — DISCHARGE NOTE PROVIDER - NSDCCPCAREPLAN_GEN_ALL_CORE_FT
PRINCIPAL DISCHARGE DIAGNOSIS  Diagnosis: Incomplete uterovaginal prolapse  Assessment and Plan of Treatment:       SECONDARY DISCHARGE DIAGNOSES  Diagnosis: Fibroid uterus  Assessment and Plan of Treatment:

## 2021-04-07 NOTE — CHART NOTE - NSCHARTNOTEFT_GEN_A_CORE
MARIBETH MCCOY is a 51yo now POD# s/p RA [facility] BS USLS and cystoscopy secondary vaginal prolapse and fibroids.     S:    Patient was seen and examined at bedside. Pain is controlled with PRN medication   She has not had anything to eat or drink since the surgery, denies N/V.   She has not ambulated yet.   - flatus/-BM/+ luevano    O:   T(C): 36.3 (04-07-21 @ 21:30), Max: 36.7 (04-07-21 @ 12:36)  HR: 74 (04-07-21 @ 21:30) (67 - 81)  BP: 112/65 (04-07-21 @ 21:30) (98/66 - 144/95)  RR: 17 (04-07-21 @ 21:30) (11 - 19)  SpO2: 98% (04-07-21 @ 21:30) (98% - 100%)    Gen: NAD, AOx3  CV: RRR  Pulm: CTAB  Abdomen: soft, nondistended, appropriately tender, + BS   Incision: clean dry and intact  Extrem: no calf tenderness or edema     Labs:       04-07-21 @ 07:01  -  04-07-21 @ 23:04  --------------------------------------------------------  IN: 0 mL / OUT: 125 mL / NET: -125 mL            A/P:   51yo now POD# s/p [facility] BS USLS and cystoscopy secondary vaginal prolapse and fibroids.   Gen: VSS  Neuro: Pain well controlled on current regimen  CV: no active issues  Pulm: incentive spirometer use encouraged  GI: Bowel sounds/function to be assessed in the AM, pt unsure if passing gas, will receive PO tonight  : Luevano in place, TOV tomorrow AM  Heme: AM labs pending  DVT ppx: ambulation encouraged, SCDs when in bed, lovenox  Dispo: will discuss with attending

## 2021-04-07 NOTE — BRIEF OPERATIVE NOTE - NSICDXBRIEFPREOP_GEN_ALL_CORE_FT
PRE-OP DIAGNOSIS:  Incomplete uterovaginal prolapse 07-Apr-2021 18:56:43  Ely Alanis  Fibroid uterus 07-Apr-2021 18:56:56  Ely Alanis

## 2021-04-07 NOTE — DISCHARGE NOTE PROVIDER - NSDCFUADDINST_GEN_ALL_CORE_FT
No heavy housework for 6-8 weeks. No lifting greater than 10 lbs for at east 6-8 weeks. No bathtub, bathing or swimming pool. You may drive if not taking narcotics. You may use stairs. Take Colace 100mg 2 times a day or Miralax one capful every night for constipation.     No heavy housework for 6-8 weeks. No lifting greater than 10 lbs for at east 6-8 weeks. No bathtub, bathing or swimming pool. You may drive if not taking narcotics. You may use stairs. Take Colace 100mg 2 times a day or Miralax one capful every night for constipation.    Medication sent to pharmacy.

## 2021-04-07 NOTE — DISCHARGE NOTE PROVIDER - NSDCMRMEDTOKEN_GEN_ALL_CORE_FT
ferrous sulfate 325 mg (65 mg elemental iron) oral tablet: 1 tab(s) orally once a day  Vitamin D3 2000 intl units (50 mcg) oral tablet: 1 tab(s) orally once a day

## 2021-04-07 NOTE — ASU PREOP CHECKLIST - PATIENT'S PERSONAL PROPERTY REMOVED
Quality 130: Documentation Of Current Medications In The Medical Record: Current Medications Documented Detail Level: Detailed Quality 431: Preventive Care And Screening: Unhealthy Alcohol Use - Screening: Patient screened for unhealthy alcohol use using a single question and scores less than 2 times per year Quality 226: Preventive Care And Screening: Tobacco Use: Screening And Cessation Intervention: Patient screened for tobacco use and is an ex/non-smoker glasses

## 2021-04-07 NOTE — BRIEF OPERATIVE NOTE - NSICDXBRIEFPROCEDURE_GEN_ALL_CORE_FT
PROCEDURES:  Robot-assisted laparoscopic total hysterectomy using da Daphney Xi with cystoscopy 07-Apr-2021 18:56:03  Ely Alanis  Bilateral salpingectomy 07-Apr-2021 18:56:12  Ely Alanis  Robot-assisted uterosacral colpopexy 07-Apr-2021 18:56:25  Ely Alanis

## 2021-04-07 NOTE — DISCHARGE NOTE PROVIDER - NSDCCPTREATMENT_GEN_ALL_CORE_FT
PRINCIPAL PROCEDURE  Procedure: Robot-assisted laparoscopic total hysterectomy using da Daphney Xi with cystoscopy  Findings and Treatment:       SECONDARY PROCEDURE  Procedure: Robot-assisted uterosacral colpopexy  Findings and Treatment:

## 2021-04-08 ENCOUNTER — TRANSCRIPTION ENCOUNTER (OUTPATIENT)
Age: 51
End: 2021-04-08

## 2021-04-08 VITALS
RESPIRATION RATE: 16 BRPM | TEMPERATURE: 99 F | SYSTOLIC BLOOD PRESSURE: 109 MMHG | OXYGEN SATURATION: 97 % | DIASTOLIC BLOOD PRESSURE: 66 MMHG | HEART RATE: 86 BPM

## 2021-04-08 LAB
ANION GAP SERPL CALC-SCNC: 10 MMOL/L — SIGNIFICANT CHANGE UP (ref 5–17)
BUN SERPL-MCNC: 11 MG/DL — SIGNIFICANT CHANGE UP (ref 8–20)
CALCIUM SERPL-MCNC: 7.9 MG/DL — LOW (ref 8.6–10.2)
CHLORIDE SERPL-SCNC: 103 MMOL/L — SIGNIFICANT CHANGE UP (ref 98–107)
CO2 SERPL-SCNC: 24 MMOL/L — SIGNIFICANT CHANGE UP (ref 22–29)
COVID-19 SPIKE DOMAIN AB INTERP: POSITIVE
COVID-19 SPIKE DOMAIN ANTIBODY RESULT: >250 U/ML — HIGH
CREAT SERPL-MCNC: 0.62 MG/DL — SIGNIFICANT CHANGE UP (ref 0.5–1.3)
GLUCOSE SERPL-MCNC: 124 MG/DL — HIGH (ref 70–99)
HCT VFR BLD CALC: 35.8 % — SIGNIFICANT CHANGE UP (ref 34.5–45)
HGB BLD-MCNC: 11.4 G/DL — LOW (ref 11.5–15.5)
MCHC RBC-ENTMCNC: 26 PG — LOW (ref 27–34)
MCHC RBC-ENTMCNC: 31.8 GM/DL — LOW (ref 32–36)
MCV RBC AUTO: 81.5 FL — SIGNIFICANT CHANGE UP (ref 80–100)
PLATELET # BLD AUTO: 202 K/UL — SIGNIFICANT CHANGE UP (ref 150–400)
POTASSIUM SERPL-MCNC: 4.2 MMOL/L — SIGNIFICANT CHANGE UP (ref 3.5–5.3)
POTASSIUM SERPL-SCNC: 4.2 MMOL/L — SIGNIFICANT CHANGE UP (ref 3.5–5.3)
RBC # BLD: 4.39 M/UL — SIGNIFICANT CHANGE UP (ref 3.8–5.2)
RBC # FLD: 22.9 % — HIGH (ref 10.3–14.5)
SARS-COV-2 IGG+IGM SERPL QL IA: >250 U/ML — HIGH
SARS-COV-2 IGG+IGM SERPL QL IA: POSITIVE
SODIUM SERPL-SCNC: 137 MMOL/L — SIGNIFICANT CHANGE UP (ref 135–145)
WBC # BLD: 10.38 K/UL — SIGNIFICANT CHANGE UP (ref 3.8–10.5)
WBC # FLD AUTO: 10.38 K/UL — SIGNIFICANT CHANGE UP (ref 3.8–10.5)

## 2021-04-08 PROCEDURE — 36415 COLL VENOUS BLD VENIPUNCTURE: CPT

## 2021-04-08 PROCEDURE — 86900 BLOOD TYPING SEROLOGIC ABO: CPT

## 2021-04-08 PROCEDURE — 86850 RBC ANTIBODY SCREEN: CPT

## 2021-04-08 PROCEDURE — 82962 GLUCOSE BLOOD TEST: CPT

## 2021-04-08 PROCEDURE — 86769 SARS-COV-2 COVID-19 ANTIBODY: CPT

## 2021-04-08 PROCEDURE — S2900: CPT

## 2021-04-08 PROCEDURE — 88307 TISSUE EXAM BY PATHOLOGIST: CPT

## 2021-04-08 PROCEDURE — 86901 BLOOD TYPING SEROLOGIC RH(D): CPT

## 2021-04-08 PROCEDURE — 85027 COMPLETE CBC AUTOMATED: CPT

## 2021-04-08 PROCEDURE — 80048 BASIC METABOLIC PNL TOTAL CA: CPT

## 2021-04-08 RX ORDER — FERROUS SULFATE 325(65) MG
1 TABLET ORAL
Qty: 0 | Refills: 0 | DISCHARGE

## 2021-04-08 RX ORDER — CHOLECALCIFEROL (VITAMIN D3) 125 MCG
1 CAPSULE ORAL
Qty: 0 | Refills: 0 | DISCHARGE

## 2021-04-08 RX ORDER — ONDANSETRON 8 MG/1
4 TABLET, FILM COATED ORAL ONCE
Refills: 0 | Status: DISCONTINUED | OUTPATIENT
Start: 2021-04-08 | End: 2021-04-08

## 2021-04-08 RX ADMIN — Medication 30 MILLIGRAM(S): at 05:48

## 2021-04-08 RX ADMIN — Medication 975 MILLIGRAM(S): at 05:18

## 2021-04-08 RX ADMIN — Medication 975 MILLIGRAM(S): at 05:48

## 2021-04-08 RX ADMIN — ENOXAPARIN SODIUM 40 MILLIGRAM(S): 100 INJECTION SUBCUTANEOUS at 05:18

## 2021-04-08 RX ADMIN — Medication 30 MILLIGRAM(S): at 05:17

## 2021-04-08 RX ADMIN — Medication 100 MILLIGRAM(S): at 01:54

## 2021-04-08 NOTE — PROGRESS NOTE ADULT - ASSESSMENT
A/P   51yo s/p RA [facility] BS USLS and cystoscopy POD#1  -Continue post operative care  -Encourage ambulation and incentive spirometry   -: voiding spontaneously, passed TOV 230in and 230out  -GI: -BM/-flatus   -DVT ppx: SCDs, ambulating  -D/C planning as per attending

## 2021-04-08 NOTE — PROGRESS NOTE ADULT - ATTENDING COMMENTS
pt seen and examined POD# 1 s/p robotic TLH/BS/USLS/cysto, doing well, tolerating diet, ambulating, tolerating diet  labs stable, exam benign, VSS  plan for d/c home, precautions reviewed

## 2021-04-08 NOTE — DISCHARGE NOTE NURSING/CASE MANAGEMENT/SOCIAL WORK - PATIENT PORTAL LINK FT
You can access the FollowMyHealth Patient Portal offered by [facility] by registering at the following website: [email or web address]. By joining [email or web address]’s FollowMyHealth portal, you will also be able to view your health information using other applications (apps) compatible with our system.

## 2021-04-08 NOTE — PROGRESS NOTE ADULT - SUBJECTIVE AND OBJECTIVE BOX
49yo s/p RA [facility] BS USLS and cystoscopy POD#1    S: Patient was seen and examined at bedside.   The patient is doing well.   Pain is controlled.   Tolerating regular diet, denies N/V.   Yates has been removed and patient has been urinating.   Patient has been ambulating.   -flatus/-BM.    O:   T(C): 36.3 (04-07-21 @ 21:30), Max: 36.7 (04-07-21 @ 12:36)  HR: 74 (04-07-21 @ 21:30) (67 - 81)  BP: 112/65 (04-07-21 @ 21:30) (98/66 - 144/95)  RR: 17 (04-07-21 @ 21:30) (11 - 19)  SpO2: 98% (04-07-21 @ 21:30) (98% - 100%)    General: NAD  CV: RRR   Lungs: CTABL  Abdomen: soft, nontender, + BS   Incision: clean dry and intact with dermabond  Pelvic: no VB  Ext: no edema, erythema or pain                          11.4   10.38 )-----------( 202      ( 08 Apr 2021 06:40 )             35.8   Basic Metabolic Panel (04.08.21 @ 06:40)    Sodium, Serum: 137 mmol/L    Potassium, Serum: 4.2 mmol/L    Chloride, Serum: 103 mmol/L    Carbon Dioxide, Serum: 24.0 mmol/L    Anion Gap, Serum: 10 mmol/L    Blood Urea Nitrogen, Serum: 11.0 mg/dL    Creatinine, Serum: 0.62 mg/dL    Glucose, Serum: 124 mg/dL    Calcium, Total Serum: 7.9 mg/dL    eGFR if Non : 104: Interpretative comment

## 2021-04-12 ENCOUNTER — NON-APPOINTMENT (OUTPATIENT)
Age: 51
End: 2021-04-12

## 2021-04-12 ENCOUNTER — TRANSCRIPTION ENCOUNTER (OUTPATIENT)
Age: 51
End: 2021-04-12

## 2021-04-12 LAB — SURGICAL PATHOLOGY STUDY: SIGNIFICANT CHANGE UP

## 2021-04-13 LAB — GLUCOSE BLDC GLUCOMTR-MCNC: 62 MG/DL — LOW (ref 70–99)

## 2021-04-14 LAB — GLUCOSE BLDC GLUCOMTR-MCNC: 114 MG/DL — HIGH (ref 70–99)

## 2021-04-24 ENCOUNTER — RX RENEWAL (OUTPATIENT)
Age: 51
End: 2021-04-24

## 2021-04-24 RX ORDER — MELOXICAM 15 MG/1
15 TABLET ORAL
Qty: 30 | Refills: 1 | Status: ACTIVE | COMMUNITY
Start: 2021-02-06 | End: 1900-01-01

## 2021-04-28 ENCOUNTER — OUTPATIENT (OUTPATIENT)
Dept: OUTPATIENT SERVICES | Facility: HOSPITAL | Age: 51
LOS: 1 days | Discharge: HOME | End: 2021-04-28

## 2021-04-28 ENCOUNTER — APPOINTMENT (OUTPATIENT)
Dept: UROGYNECOLOGY | Facility: CLINIC | Age: 51
End: 2021-04-28
Payer: COMMERCIAL

## 2021-04-28 VITALS — DIASTOLIC BLOOD PRESSURE: 89 MMHG | SYSTOLIC BLOOD PRESSURE: 131 MMHG

## 2021-04-28 DIAGNOSIS — N81.9 FEMALE GENITAL PROLAPSE, UNSPECIFIED: ICD-10-CM

## 2021-04-28 DIAGNOSIS — Z98.890 OTHER SPECIFIED POSTPROCEDURAL STATES: Chronic | ICD-10-CM

## 2021-04-28 DIAGNOSIS — Z98.51 TUBAL LIGATION STATUS: Chronic | ICD-10-CM

## 2021-04-28 DIAGNOSIS — D64.9 ANEMIA, UNSPECIFIED: ICD-10-CM

## 2021-04-28 DIAGNOSIS — Z86.018 PERSONAL HISTORY OF OTHER BENIGN NEOPLASM: ICD-10-CM

## 2021-04-28 LAB
BILIRUB UR QL STRIP: NEGATIVE
CLARITY UR: CLEAR
COLLECTION METHOD: NORMAL
GLUCOSE UR-MCNC: NEGATIVE
HCG UR QL: 0.2 EU/DL
HGB UR QL STRIP.AUTO: NORMAL
KETONES UR-MCNC: NEGATIVE
LEUKOCYTE ESTERASE UR QL STRIP: NEGATIVE
NITRITE UR QL STRIP: NEGATIVE
PH UR STRIP: 6.5
PROT UR STRIP-MCNC: NEGATIVE
SP GR UR STRIP: >=1.03

## 2021-04-28 PROCEDURE — 99024 POSTOP FOLLOW-UP VISIT: CPT

## 2021-04-28 NOTE — SUBJECTIVE
[FreeTextEntry1] : overall doing well and very happy with surgery [FreeTextEntry8] : none new [FreeTextEntry7] : none [FreeTextEntry6] : normal [FreeTextEntry5] : denies any dysuria, incomplete emptying or JAYLON [FreeTextEntry4] : occasional constipation, taking colace and prunes [FreeTextEntry3] : normal [FreeTextEntry2] : normal

## 2021-04-28 NOTE — DISCUSSION/SUMMARY
[Post-Op instructions given. Pt/family verbalizes understanding] : post-operative instructions were provided to the patient/family who verbalize understanding [FreeTextEntry1] : Pt doing very well post-op.  Activity restriction discussed.  Advised colace/metamucil/miralax PRN.  F/u 4 weeks or sooner if needed.  Instructed to call with any questions or concerns and she verbalizes understanding.

## 2021-04-30 ENCOUNTER — RESULT REVIEW (OUTPATIENT)
Age: 51
End: 2021-04-30

## 2021-04-30 ENCOUNTER — APPOINTMENT (OUTPATIENT)
Dept: HEMATOLOGY ONCOLOGY | Facility: CLINIC | Age: 51
End: 2021-04-30
Payer: COMMERCIAL

## 2021-04-30 VITALS
BODY MASS INDEX: 25.77 KG/M2 | DIASTOLIC BLOOD PRESSURE: 86 MMHG | RESPIRATION RATE: 16 BRPM | HEART RATE: 69 BPM | TEMPERATURE: 97.6 F | SYSTOLIC BLOOD PRESSURE: 136 MMHG | HEIGHT: 64.76 IN | OXYGEN SATURATION: 100 % | WEIGHT: 152.78 LBS

## 2021-04-30 LAB
ALBUMIN SERPL ELPH-MCNC: 4.1 G/DL
ALP BLD-CCNC: 82 U/L
ALT SERPL-CCNC: 20 U/L
ANION GAP SERPL CALC-SCNC: 10 MMOL/L
AST SERPL-CCNC: 19 U/L
BASOPHILS # BLD AUTO: 0.04 K/UL — SIGNIFICANT CHANGE UP (ref 0–0.2)
BASOPHILS NFR BLD AUTO: 0.7 % — SIGNIFICANT CHANGE UP (ref 0–2)
BILIRUB SERPL-MCNC: 0.3 MG/DL
BUN SERPL-MCNC: 14 MG/DL
CALCIUM SERPL-MCNC: 8.9 MG/DL
CHLORIDE SERPL-SCNC: 104 MMOL/L
CO2 SERPL-SCNC: 25 MMOL/L
CREAT SERPL-MCNC: 0.69 MG/DL
EOSINOPHIL # BLD AUTO: 0.34 K/UL — SIGNIFICANT CHANGE UP (ref 0–0.5)
EOSINOPHIL NFR BLD AUTO: 6.1 % — HIGH (ref 0–6)
GLUCOSE SERPL-MCNC: 83 MG/DL
HCT VFR BLD CALC: 39.5 % — SIGNIFICANT CHANGE UP (ref 34.5–45)
HGB BLD-MCNC: 12.6 G/DL — SIGNIFICANT CHANGE UP (ref 11.5–15.5)
IMM GRANULOCYTES NFR BLD AUTO: 0.2 % — SIGNIFICANT CHANGE UP (ref 0–1.5)
LDH SERPL-CCNC: 154 U/L
LYMPHOCYTES # BLD AUTO: 1.61 K/UL — SIGNIFICANT CHANGE UP (ref 1–3.3)
LYMPHOCYTES # BLD AUTO: 29.1 % — SIGNIFICANT CHANGE UP (ref 13–44)
MCHC RBC-ENTMCNC: 27.2 PG — SIGNIFICANT CHANGE UP (ref 27–34)
MCHC RBC-ENTMCNC: 31.9 G/DL — LOW (ref 32–36)
MCV RBC AUTO: 85.3 FL — SIGNIFICANT CHANGE UP (ref 80–100)
MONOCYTES # BLD AUTO: 0.32 K/UL — SIGNIFICANT CHANGE UP (ref 0–0.9)
MONOCYTES NFR BLD AUTO: 5.8 % — SIGNIFICANT CHANGE UP (ref 2–14)
NEUTROPHILS # BLD AUTO: 3.22 K/UL — SIGNIFICANT CHANGE UP (ref 1.8–7.4)
NEUTROPHILS NFR BLD AUTO: 58.1 % — SIGNIFICANT CHANGE UP (ref 43–77)
NRBC # BLD: 0 /100 WBCS — SIGNIFICANT CHANGE UP (ref 0–0)
PLATELET # BLD AUTO: 275 K/UL — SIGNIFICANT CHANGE UP (ref 150–400)
POTASSIUM SERPL-SCNC: 4.6 MMOL/L
PROT SERPL-MCNC: 7.3 G/DL
RBC # BLD: 4.63 M/UL — SIGNIFICANT CHANGE UP (ref 3.8–5.2)
RBC # FLD: 19.4 % — HIGH (ref 10.3–14.5)
SODIUM SERPL-SCNC: 139 MMOL/L
WBC # BLD: 5.54 K/UL — SIGNIFICANT CHANGE UP (ref 3.8–10.5)
WBC # FLD AUTO: 5.54 K/UL — SIGNIFICANT CHANGE UP (ref 3.8–10.5)

## 2021-04-30 PROCEDURE — 99214 OFFICE O/P EST MOD 30 MIN: CPT

## 2021-04-30 PROCEDURE — 99072 ADDL SUPL MATRL&STAF TM PHE: CPT

## 2021-04-30 RX ORDER — TRIAMCINOLONE ACETONIDE 5 MG/G
0.5 OINTMENT TOPICAL
Qty: 15 | Refills: 0 | Status: ACTIVE | COMMUNITY
Start: 2021-04-12

## 2021-04-30 RX ORDER — PREDNISONE 20 MG/1
20 TABLET ORAL
Qty: 4 | Refills: 0 | Status: ACTIVE | COMMUNITY
Start: 2021-04-12

## 2021-04-30 RX ORDER — IBUPROFEN 800 MG/1
800 TABLET, FILM COATED ORAL
Qty: 30 | Refills: 0 | Status: ACTIVE | COMMUNITY
Start: 2021-03-02

## 2021-04-30 RX ORDER — CETIRIZINE HYDROCHLORIDE 10 MG/1
10 TABLET, COATED ORAL
Qty: 30 | Refills: 0 | Status: ACTIVE | COMMUNITY
Start: 2021-04-12

## 2021-04-30 RX ORDER — FAMOTIDINE 20 MG/1
20 TABLET, FILM COATED ORAL
Qty: 14 | Refills: 0 | Status: ACTIVE | COMMUNITY
Start: 2021-04-12

## 2021-04-30 NOTE — PHYSICAL EXAM
[Restricted in physically strenuous activity but ambulatory and able to carry out work of a light or sedentary nature] : Status 1- Restricted in physically strenuous activity but ambulatory and able to carry out work of a light or sedentary nature, e.g., light house work, office work [Normal] : no peripheral adenopathy appreciated

## 2021-04-30 NOTE — HISTORY OF PRESENT ILLNESS
[0 - No Distress] : Distress Level: 0 [de-identified] : 49 yo woman with PMhx of stage II uterine prolapse, referred for evaluation of  anemia and a  3.9 cm lesion abutting the superior left side of the uterus which may indicate partially exophytic degenerating uterine myoma versus dilated bowel loop or adnexal cyst. Documented on a CT scan on 8/3/2020. \par \par MRI- 7/31/20 1.2x1.3cm enhancing lesion left side of sacrum. \par \par Bone sacrum left CT guided biopsy 9/25/20 c/w cellular bone marrow with progressive trilineage hematopiesis and mild plasmacytosis. \par \par Patient has been evaluated by Dr Ana Fulton GYN Uro for hysterectomy. \par \par Patient was also referred for anemia, history of fibroids and anemia. \par \par 2/5/21- WBC 4.5, Hb 9.9 g/dl, Hct 33.3%, MCV 71.5%, RDW 23.4%, . \par \par Patient complaints of joint pain, stiffness, localized to most joints specially elbow, knees and lower back for approx 1 year. Patient is scheduled for hysterectomy on 4/7/21. She has irregulars periods, last one was on January 2021, bled for one month. Patient feels fatigue, lightheadedness, denies palpitations, chest pains, denies sob with exertion. She is on iron tablets since 2/2021. \par Denies melena or hematochezia.  [de-identified] : PET/CT 3/31/21 Minimally FDG -PET/ CT avid in the left hemisacrum. SUV 2.4, unlikely malignant. \par Work up for MM has been negative, no evidence of monoclonal protein. Urine lytes elevated with abnormal ratio will do a fat pad biopsy to r/o amyloidosis once patient recovers from hysterectomy. \par \par No other changes in medical, surgical or social history since 3/25/2021. \par

## 2021-04-30 NOTE — ASSESSMENT
[FreeTextEntry1] : 49 yo woman with PMhx of stage II uterine prolapse, referred for evaluation of  anemia and a  3.9 cm lesion abutting the superior left side of the uterus which may indicate partially exophytic degenerating uterine myoma versus dilated bowel loop or adnexal cyst. Documented on a CT scan on 8/3/2020. \par \par MRI- 7/31/20 1.2x1.3cm enhancing lesion left side of sacrum. \par \par Bone sacrum left CT guided biopsy 9/25/20 c/w cellular bone marrow with progressive trilineage hematopiesis and mild plasmacytosis. \par \par Patient was also referred for anemia, history of fibroids and anemia. \par \par 2/5/21- WBC 4.5, Hb 9.9 g/dl, Hct 33.3%, MCV 71.5%, RDW 23.4%, . \par \par S/P Hysterectomy on 4/7/21, feeling well. \par \par PET/CT 3/31/21 Minimally FDG -PET/ CT avid in the left feng sacrum. SUV 2.4, unlikely malignant. \par Work up for MM has been negative, no evidence of monoclonal protein. Urine lytes elevated with abnormal ratio will do a fat pad biopsy to r/o amyloidosis .\par \par Iron deficiency anemia: Continue iron supplement for another month then DC. \par \par RTC 4 months. \par \par

## 2021-04-30 NOTE — REVIEW OF SYSTEMS
[Fatigue] : fatigue [Joint Pain] : joint pain [Joint Stiffness] : joint stiffness [Hot Flashes] : hot flashes [Dizziness] : no dizziness [Swollen Glands] : no swollen glands [Negative] : Heme/Lymph

## 2021-05-21 ENCOUNTER — APPOINTMENT (OUTPATIENT)
Dept: UROGYNECOLOGY | Facility: CLINIC | Age: 51
End: 2021-05-21
Payer: COMMERCIAL

## 2021-05-21 ENCOUNTER — RESULT CHARGE (OUTPATIENT)
Age: 51
End: 2021-05-21

## 2021-05-21 VITALS — DIASTOLIC BLOOD PRESSURE: 100 MMHG | SYSTOLIC BLOOD PRESSURE: 145 MMHG

## 2021-05-21 DIAGNOSIS — Z98.890 OTHER SPECIFIED POSTPROCEDURAL STATES: ICD-10-CM

## 2021-05-21 LAB
BILIRUB UR QL STRIP: NEGATIVE
CLARITY UR: CLEAR
COLLECTION METHOD: NORMAL
GLUCOSE UR-MCNC: NEGATIVE
HCG UR QL: 0.2 EU/DL
HGB UR QL STRIP.AUTO: NEGATIVE
KETONES UR-MCNC: NORMAL
LEUKOCYTE ESTERASE UR QL STRIP: NEGATIVE
NITRITE UR QL STRIP: NEGATIVE
PH UR STRIP: 5.5
PROT UR STRIP-MCNC: NEGATIVE
SP GR UR STRIP: 1.03

## 2021-05-21 PROCEDURE — 51798 US URINE CAPACITY MEASURE: CPT

## 2021-05-21 PROCEDURE — 99024 POSTOP FOLLOW-UP VISIT: CPT

## 2021-05-21 NOTE — SUBJECTIVE
[FreeTextEntry1] : very happy with surgery, no fever/chills, no vag disch or bleeding [FreeTextEntry7] : none, RLQ patch on skin of reduced sensation not "pain" but different sesnation without protrusion pain or GI complaints [FreeTextEntry6] : no n/v, normal [FreeTextEntry5] : good, no incomplete emtpying, no leakage, no dysuria or UTIs, no heamturia [FreeTextEntry4] : normal consistency without straining or constipation [FreeTextEntry3] : normal [FreeTextEntry2] : normal

## 2021-05-21 NOTE — DISCUSSION/SUMMARY
[Post-Op instructions given. Pt/family verbalizes understanding] : post-operative instructions were provided to the patient/family who verbalize understanding [Risks/Benefits discussed. Pt/family verbalizes understanding] : risks and benefits of the procedure were discussed with the patient/family who verbalize understanding [FreeTextEntry1] : 6 weeks postop from 4/7/21: RATLH / BS / USLS / cysto. Path benign, fibroid uterus. Normal exam today - normal PVR, cuff intact with 1 delayed abs suture palp and no extrusion, no POP. RTO for 1 year exam, sooner prn. Precautions and slow return to sexual activity and exercise reviewed. All ques answered. Routine care with PMD, gyn, specialists.

## 2021-05-21 NOTE — OBJECTIVE
[Post Void Residual ____ ml] : Post Void Residual was [unfilled] ml [Soft and Nontender] : soft and nontender [Clean, Dry, Intact] : Clean, Dry, Intact [Good Support] : Good support [Healing well] : healing well [No Masses or Tenderness] : no masses or tenderness [FreeTextEntry2] : 5 x robotic incisions [FreeTextEntry3] : 1 delayed absorb suture palp at cuff/apex, no POP, no mass lesion disch or bleeding

## 2021-06-08 ENCOUNTER — RESULT REVIEW (OUTPATIENT)
Age: 51
End: 2021-06-08

## 2021-06-08 ENCOUNTER — APPOINTMENT (OUTPATIENT)
Dept: ULTRASOUND IMAGING | Facility: IMAGING CENTER | Age: 51
End: 2021-06-08
Payer: COMMERCIAL

## 2021-06-08 ENCOUNTER — OUTPATIENT (OUTPATIENT)
Dept: OUTPATIENT SERVICES | Facility: HOSPITAL | Age: 51
LOS: 1 days | End: 2021-06-08
Payer: COMMERCIAL

## 2021-06-08 DIAGNOSIS — Z98.890 OTHER SPECIFIED POSTPROCEDURAL STATES: Chronic | ICD-10-CM

## 2021-06-08 DIAGNOSIS — Z98.51 TUBAL LIGATION STATUS: Chronic | ICD-10-CM

## 2021-06-08 DIAGNOSIS — M53.3 SACROCOCCYGEAL DISORDERS, NOT ELSEWHERE CLASSIFIED: ICD-10-CM

## 2021-06-08 PROCEDURE — 20206 BIOPSY MUSCLE PERQ NEEDLE: CPT

## 2021-06-08 PROCEDURE — 88313 SPECIAL STAINS GROUP 2: CPT

## 2021-06-08 PROCEDURE — 76942 ECHO GUIDE FOR BIOPSY: CPT

## 2021-06-08 PROCEDURE — 88305 TISSUE EXAM BY PATHOLOGIST: CPT | Mod: 26

## 2021-06-08 PROCEDURE — 88305 TISSUE EXAM BY PATHOLOGIST: CPT

## 2021-06-08 PROCEDURE — 88313 SPECIAL STAINS GROUP 2: CPT | Mod: 26

## 2021-06-08 PROCEDURE — 76942 ECHO GUIDE FOR BIOPSY: CPT | Mod: 26

## 2021-06-10 ENCOUNTER — APPOINTMENT (OUTPATIENT)
Dept: HEMATOLOGY ONCOLOGY | Facility: CLINIC | Age: 51
End: 2021-06-10
Payer: COMMERCIAL

## 2021-06-10 ENCOUNTER — NON-APPOINTMENT (OUTPATIENT)
Age: 51
End: 2021-06-10

## 2021-06-10 DIAGNOSIS — D64.9 ANEMIA, UNSPECIFIED: ICD-10-CM

## 2021-06-10 DIAGNOSIS — M53.3 SACROCOCCYGEAL DISORDERS, NOT ELSEWHERE CLASSIFIED: ICD-10-CM

## 2021-06-10 LAB — SURGICAL PATHOLOGY STUDY: SIGNIFICANT CHANGE UP

## 2021-06-10 PROCEDURE — 99442: CPT

## 2021-06-10 NOTE — HISTORY OF PRESENT ILLNESS
[de-identified] : 51 yo woman with PMhx of stage II uterine prolapse, referred for evaluation of  anemia and a  3.9 cm lesion abutting the superior left side of the uterus which may indicate partially exophytic degenerating uterine myoma versus dilated bowel loop or adnexal cyst. Documented on a CT scan on 8/3/2020. \par \par MRI- 7/31/20 1.2x1.3cm enhancing lesion left side of sacrum. \par \par Bone sacrum left CT guided biopsy 9/25/20 c/w cellular bone marrow with progressive trilineage hematopiesis and mild plasmacytosis. \par \par Patient has been evaluated by Dr Ana Fulton GYN Uro for hysterectomy. \par \par Patient was also referred for anemia, history of fibroids and anemia. \par \par 2/5/21- WBC 4.5, Hb 9.9 g/dl, Hct 33.3%, MCV 71.5%, RDW 23.4%, . \par \par Patient complaints of joint pain, stiffness, localized to most joints specially elbow, knees and lower back for approx 1 year. Patient is scheduled for hysterectomy on 4/7/21. She has irregulars periods, last one was on January 2021, bled for one month. Patient feels fatigue, lightheadedness, denies palpitations, chest pains, denies sob with exertion. She is on iron tablets since 2/2021. \par Denies melena or hematochezia.  [de-identified] : PET/CT 3/31/21 Minimally FDG -PET/ CT avid in the left hemisacrum. SUV 2.4, unlikely malignant. \par Work up for MM has been negative, no evidence of monoclonal protein. Urine lytes elevated with abnormal ratio. Fat pad biopsy negative for [place] red. \par \par No other changes in medical, surgical or social history since 3/25/2021. \par

## 2021-06-10 NOTE — ASSESSMENT
[FreeTextEntry1] : 51 yo woman with PMhx of stage II uterine prolapse, referred for evaluation of  anemia and a  3.9 cm lesion abutting the superior left side of the uterus which may indicate partially exophytic degenerating uterine myoma versus dilated bowel loop or adnexal cyst. Documented on a CT scan on 8/3/2020. \par \par MRI- 7/31/20 1.2x1.3cm enhancing lesion left side of sacrum. \par \par Bone sacrum left CT guided biopsy 9/25/20 c/w cellular bone marrow with progressive trilineage hematopiesis and mild plasmacytosis. \par \par Patient was also referred for anemia, history of fibroids and anemia. \par \par 2/5/21- WBC 4.5, Hb 9.9 g/dl, Hct 33.3%, MCV 71.5%, RDW 23.4%, . \par \par S/P Hysterectomy on 4/7/21, feeling well. \par \par PET/CT 3/31/21 Minimally FDG -PET/ CT avid in the left feng sacrum. SUV 2.4, unlikely malignant. \par Work up for MM has been negative, no evidence of monoclonal protein. Urine lytes elevated with abnormal ratio . Fat pad biopsy was negative for [place] red.  I referred patient back to PCP no evidence of plasma cell dyscrasia. \par \par Iron deficiency anemia: Continue iron supplement for another month then DC. \par \par This service was provided by using telehealth. The patient was at home and I was at [facility] [place] [facility]. The patient requested and participated in this encounter. The encounter  last  20  minutes coordinating his/her care and counseling.\par \par \par RTC prn\par \par

## 2021-08-23 ENCOUNTER — APPOINTMENT (OUTPATIENT)
Dept: ORTHOPEDIC SURGERY | Facility: CLINIC | Age: 51
End: 2021-08-23

## 2021-08-27 ENCOUNTER — APPOINTMENT (OUTPATIENT)
Dept: HEMATOLOGY ONCOLOGY | Facility: CLINIC | Age: 51
End: 2021-08-27

## 2021-10-12 ENCOUNTER — APPOINTMENT (OUTPATIENT)
Dept: ORTHOPEDIC SURGERY | Facility: CLINIC | Age: 51
End: 2021-10-12
Payer: COMMERCIAL

## 2021-10-12 DIAGNOSIS — M25.50 PAIN IN UNSPECIFIED JOINT: ICD-10-CM

## 2021-10-12 DIAGNOSIS — G56.01 CARPAL TUNNEL SYNDROME, RIGHT UPPER LIMB: ICD-10-CM

## 2021-10-12 PROCEDURE — 72170 X-RAY EXAM OF PELVIS: CPT

## 2021-10-12 PROCEDURE — 99214 OFFICE O/P EST MOD 30 MIN: CPT

## 2021-10-12 NOTE — DATA REVIEWED
[de-identified] : 10/12/2021–AP pelvis x-ray: There are no fractures or dislocations.  Stable appearing sacral lesion, without change from prior, difficult to visualize on x-ray.\par \par \par 4/2/2021–PET/CT: Minimally FDG avid sclerotic density in the left hemisacrum.  No evidence of FDG avid disease in the remainder of the study.

## 2021-10-12 NOTE — PHYSICAL EXAM
[FreeTextEntry1] : General: well nourished, in no acute distress, alert and oriented to person, place and time.\par Psychiatric: normal mood and affect, no abnormal movements or speech patterns.\par Eyes: vision intact without deficits, sclera and conjunctiva were normal, pupils were equal in size. \par ENT: Ears and nose were normal in appearance. No thyromegaly.\par Lymph: no enlarged nodes, no lymphedema in extremity.\par Respiratory: Normal respiratory rhythm and effort. No wheezing detected without auscultation. No shortness of breath or respiratory distress.\par Cardiac: no cardiac related leg swelling, 2+ peripheral pulses.\par Neurology: normal gross sensation in extremities to light touch.\par Abdomen: soft, non-tender, tympanic, no masses.\par \par B/L LE:\par \par Skin CDI. No ecchymosis or swelling. \par No instability. No TTP over GT. No palpable masses. No lymphedema.\par Negative LR, HS, SLR. \par ROM: FF: 90. IR: 5. ER: 30, painless\par EHL/FHL/GS/TA 5/5. S/S/SP/DP/T SILT. Toes warm, BCR. Compartments soft.\par R foot: cdi, no TTP throughout. \par \par R elbow: skin cdi, no TTP, full painless ROM and supination/pronation. \par R foot: No TTP, full ROM. \par

## 2021-10-12 NOTE — HISTORY OF PRESENT ILLNESS
[FreeTextEntry1] : This is a 50-year-old female with no past medical history who returns for FU of an incidentally found lesion within her left sacrum. This was found after she was being worked up for a uterine myoma. IR core biopsy was performed (9/2/2020) showed findings consistent with reactive changes: trilineage hematopoiesis.  Has since been evaluated by heme-onc who recommended a PET scan on 3/2021 with findings consistent with a benign appearing lesion in the left sacrum.  She also underwent a fat pad biopsy which was negative.  During this time she continues to have diffuse body aches, and as of 7/2021 also complains of right foot pain.  She was evaluated by a podiatrist recently who recommended piroxicam however this caused constipation and she therefore has discontinued this medication.  She describes right elbow pain radiating to her fingers with occasional numbness of her third and fourth digits as of 2 weeks ago.  Denies any trauma.  She also complains of bilateral hip pain.

## 2021-11-08 NOTE — DISCUSSION/SUMMARY
[de-identified] : This is a 51-year-old female with persistent polyarthralgias of undetermined etiology.  The sacral lesion appears stable and has been biopsy-proven as benign.  On physical exam, she has no abnormalities in terms of tenderness or range of motion.  Her symptoms of pain are unclear, therefore I have recommended a referral to rheumatology for further assessment.  She does have mild right carpal tunnel syndrome for which I recommended a night brace which was provided.  May otherwise follow-up on a as needed basis. Cardiac

## 2022-02-02 NOTE — DISCUSSION/SUMMARY
[de-identified] : This is a 49-year-old female with an indeterminate left sacral lesion.  The various possibilities include neoplastic, infectious, or benign etiologies.  I recommended a bone scan to better assess the activity of this lesion and to assess for any other lesions within the skeleton.  I presented the to options for management including with surveillance versus IR guided biopsy.  The patient has opted for IR guided biopsy.  I have put a referral in for this procedure and she will follow-up after the procedure for the results, which will further guide management.  This plan was discussed in detail with the patient who was in full agreement. All questions were answered.  Detail Level: Simple

## 2022-10-20 NOTE — H&P PST ADULT - RESPIRATORY
at 38w 2d, male infant was delivered in OA position after two maternal pushing efforts, no nuchal cord noted, no lacerations, placenta was delivered intact. Cervix looked intact, no bruising or excessive bleeding noted. Fundus was confirmed firm with pitocin running and following placement of cytotec 800mcg IA. EBL was 300cc. Both mother and baby are stable and doing well.     Ricci Chowdary D.O.  Obstetrics and Gynecology  10/20/22    10:14 AM   
Breath Sounds equal & clear to percussion & auscultation, no accessory muscle use

## 2023-08-10 NOTE — ASSESSMENT
[FreeTextEntry1] : Symptomatic stage II POP (all apical), menorrhagia. Large fibroid uterus 10 x 12 cm plus multiple 2-5 cm fibroids -  discussed - not a candidate for vaginal hyst, if vag surgery desired it would be a hysteropexy. However she has menorrhagia and desires hysterectomy as part of surgery. Abdominally, candidate for TABITHA/ASC (open vs attempt robotic) vs TLH/ASC (increases risk of mesh exposure) or robotic TLH/USLS - based on uterine size, r/b/a and efficacies of surgeries, she ops for robotic TLH/BS/USLS/possible APR/cysto. UDS neg discussed, risk of postop OSUI present, eval if needed thereafter. POP surg to correct POP, and may not affect OAB-dry symptoms reviewed.\par \par Abdominal versus vaginal routes of surgery were reviewed. Abdominal surgery via robotic laparoscopy vs laparotomy discussed. Surgery with or without hysterectomy discussed. Surgery with or without graft use discussed. Efficacies, risks and benefits reviewed. Abdominally, a supracervical hysterectomy plus sacral colpopexy was discussed. Vaginally, a total hysterectomy plus uterosacral ligament suspension was discussed. \par A TABITHA versus total hysterectomy was discussed. With TABITHA, risk of cervical cancer in the future and need for routine pap smear surveillance was discussed. Benefits of TABITHA including decreased risk of mesh exposure was discussed. Bilateral salpingectomy was discussed to decrease the future risk of ovarian cancer. The benefit of oophorectomy to decrease the future risk of ovarian cancer or need for future ovarian surgery was discussed. The protective benefits of retaining ovaries for bone and cardiac health as well as decreased all-cause mortality rate was discussed. \par \par The risks and benefits of surgery were discussed and included: cardiopulmonary arrest, infection, abscess formation, bleeding, hematoma formation, hemorrhage, transfusion, blood clot formation, fistula formation, rejection of graft if used, dyspareunia, chronic pain, neuropathy, damage to surrounding structures including but not limited to bowel/ureters/bladder/rectum, leakage of urine, voiding dysfunction, OAB, urinary retention, procedure failure, recurrence, need for further surgery, small bowel obstruction, defecatory dysfunction, and going home with a catheter. With the use of mesh, risk of mesh erosion or exposure discussed. The FDA warning on transvaginally placed mesh for prolapse correction versus abdominally placed mesh or transvaginally placed mesh for midurethral sling was reviewed. Website for the FDA provided for information as desired. An exam under anesthesia was discussed and consented to as well. Perioperative care, anesthesia, no eating or drinking after midnight prior to the surgery, and postop precautions were reviewed. All questions were answered. She understood and would like to proceed. Consent was signed and witnessed in the office.\par \par Plan:\par [] calculate BMI\par [] COVID testing\par [] PSTs\par [] med clearance\par [] book pelvic EUA / RTLH / BS / USLS / APR / Cysto / possible laparotomy / other indicated procedures 20